# Patient Record
Sex: MALE | Race: WHITE | NOT HISPANIC OR LATINO | Employment: UNEMPLOYED | URBAN - METROPOLITAN AREA
[De-identification: names, ages, dates, MRNs, and addresses within clinical notes are randomized per-mention and may not be internally consistent; named-entity substitution may affect disease eponyms.]

---

## 2018-01-15 ENCOUNTER — APPOINTMENT (OUTPATIENT)
Dept: RADIOLOGY | Facility: MEDICAL CENTER | Age: 48
DRG: 473 | End: 2018-01-15
Attending: EMERGENCY MEDICINE
Payer: MEDICAID

## 2018-01-15 ENCOUNTER — HOSPITAL ENCOUNTER (OUTPATIENT)
Dept: RADIOLOGY | Facility: MEDICAL CENTER | Age: 48
End: 2018-01-15

## 2018-01-15 ENCOUNTER — HOSPITAL ENCOUNTER (INPATIENT)
Facility: MEDICAL CENTER | Age: 48
LOS: 3 days | DRG: 473 | End: 2018-01-21
Attending: EMERGENCY MEDICINE | Admitting: INTERNAL MEDICINE
Payer: MEDICAID

## 2018-01-15 ENCOUNTER — RESOLUTE PROFESSIONAL BILLING HOSPITAL PROF FEE (OUTPATIENT)
Dept: HOSPITALIST | Facility: MEDICAL CENTER | Age: 48
End: 2018-01-15
Payer: MEDICAID

## 2018-01-15 DIAGNOSIS — R26.81 GAIT INSTABILITY: ICD-10-CM

## 2018-01-15 DIAGNOSIS — Z98.890 S/P LAMINECTOMY: ICD-10-CM

## 2018-01-15 DIAGNOSIS — R29.898 WEAKNESS OF EXTREMITY: ICD-10-CM

## 2018-01-15 PROBLEM — F31.9 BIPOLAR DISORDER (HCC): Status: ACTIVE | Noted: 2018-01-15

## 2018-01-15 PROBLEM — E03.9 HYPOTHYROID: Status: ACTIVE | Noted: 2018-01-15

## 2018-01-15 PROBLEM — I10 HTN (HYPERTENSION): Status: ACTIVE | Noted: 2018-01-15

## 2018-01-15 LAB
ALBUMIN SERPL BCP-MCNC: 4.8 G/DL (ref 3.2–4.9)
ALBUMIN/GLOB SERPL: 1.4 G/DL
ALP SERPL-CCNC: 106 U/L (ref 30–99)
ALT SERPL-CCNC: 31 U/L (ref 2–50)
ANION GAP SERPL CALC-SCNC: 10 MMOL/L (ref 0–11.9)
AST SERPL-CCNC: 28 U/L (ref 12–45)
BASOPHILS # BLD AUTO: 0.5 % (ref 0–1.8)
BASOPHILS # BLD: 0.05 K/UL (ref 0–0.12)
BILIRUB SERPL-MCNC: 0.5 MG/DL (ref 0.1–1.5)
BUN SERPL-MCNC: 15 MG/DL (ref 8–22)
CALCIUM SERPL-MCNC: 9.9 MG/DL (ref 8.5–10.5)
CHLORIDE SERPL-SCNC: 104 MMOL/L (ref 96–112)
CO2 SERPL-SCNC: 22 MMOL/L (ref 20–33)
CREAT SERPL-MCNC: 0.98 MG/DL (ref 0.5–1.4)
EOSINOPHIL # BLD AUTO: 0.02 K/UL (ref 0–0.51)
EOSINOPHIL NFR BLD: 0.2 % (ref 0–6.9)
ERYTHROCYTE [DISTWIDTH] IN BLOOD BY AUTOMATED COUNT: 40.3 FL (ref 35.9–50)
GLOBULIN SER CALC-MCNC: 3.4 G/DL (ref 1.9–3.5)
GLUCOSE SERPL-MCNC: 92 MG/DL (ref 65–99)
HCT VFR BLD AUTO: 44.1 % (ref 42–52)
HGB BLD-MCNC: 15.5 G/DL (ref 14–18)
IMM GRANULOCYTES # BLD AUTO: 0.05 K/UL (ref 0–0.11)
IMM GRANULOCYTES NFR BLD AUTO: 0.5 % (ref 0–0.9)
LYMPHOCYTES # BLD AUTO: 1.53 K/UL (ref 1–4.8)
LYMPHOCYTES NFR BLD: 16.5 % (ref 22–41)
MCH RBC QN AUTO: 31.3 PG (ref 27–33)
MCHC RBC AUTO-ENTMCNC: 35.1 G/DL (ref 33.7–35.3)
MCV RBC AUTO: 88.9 FL (ref 81.4–97.8)
MONOCYTES # BLD AUTO: 0.47 K/UL (ref 0–0.85)
MONOCYTES NFR BLD AUTO: 5.1 % (ref 0–13.4)
NEUTROPHILS # BLD AUTO: 7.16 K/UL (ref 1.82–7.42)
NEUTROPHILS NFR BLD: 77.2 % (ref 44–72)
NRBC # BLD AUTO: 0 K/UL
NRBC BLD-RTO: 0 /100 WBC
PLATELET # BLD AUTO: 361 K/UL (ref 164–446)
PMV BLD AUTO: 10.7 FL (ref 9–12.9)
POTASSIUM SERPL-SCNC: 4.1 MMOL/L (ref 3.6–5.5)
PROT SERPL-MCNC: 8.2 G/DL (ref 6–8.2)
RBC # BLD AUTO: 4.96 M/UL (ref 4.7–6.1)
SODIUM SERPL-SCNC: 136 MMOL/L (ref 135–145)
TSH SERPL DL<=0.005 MIU/L-ACNC: 2.65 UIU/ML (ref 0.38–5.33)
WBC # BLD AUTO: 9.3 K/UL (ref 4.8–10.8)

## 2018-01-15 PROCEDURE — 700102 HCHG RX REV CODE 250 W/ 637 OVERRIDE(OP): Performed by: FAMILY MEDICINE

## 2018-01-15 PROCEDURE — 99285 EMERGENCY DEPT VISIT HI MDM: CPT

## 2018-01-15 PROCEDURE — 84443 ASSAY THYROID STIM HORMONE: CPT

## 2018-01-15 PROCEDURE — G0378 HOSPITAL OBSERVATION PER HR: HCPCS

## 2018-01-15 PROCEDURE — 99220 PR INITIAL OBSERVATION CARE,LEVL III: CPT | Performed by: FAMILY MEDICINE

## 2018-01-15 PROCEDURE — A9270 NON-COVERED ITEM OR SERVICE: HCPCS | Performed by: FAMILY MEDICINE

## 2018-01-15 PROCEDURE — 85025 COMPLETE CBC W/AUTO DIFF WBC: CPT

## 2018-01-15 PROCEDURE — 36415 COLL VENOUS BLD VENIPUNCTURE: CPT

## 2018-01-15 PROCEDURE — 71045 X-RAY EXAM CHEST 1 VIEW: CPT

## 2018-01-15 PROCEDURE — 80053 COMPREHEN METABOLIC PANEL: CPT

## 2018-01-15 RX ORDER — GABAPENTIN 300 MG/1
300 CAPSULE ORAL 3 TIMES DAILY
Status: ON HOLD | COMMUNITY
Start: 2018-01-17 | End: 2018-11-12

## 2018-01-15 RX ORDER — GABAPENTIN 300 MG/1
300 CAPSULE ORAL 3 TIMES DAILY
Status: DISCONTINUED | OUTPATIENT
Start: 2018-01-17 | End: 2018-01-21 | Stop reason: HOSPADM

## 2018-01-15 RX ORDER — GABAPENTIN 300 MG/1
300 CAPSULE ORAL 3 TIMES DAILY
COMMUNITY
End: 2018-01-15

## 2018-01-15 RX ORDER — GEMFIBROZIL 600 MG/1
600 TABLET, FILM COATED ORAL 2 TIMES DAILY
COMMUNITY

## 2018-01-15 RX ORDER — ONDANSETRON 4 MG/1
4 TABLET, ORALLY DISINTEGRATING ORAL EVERY 4 HOURS PRN
Status: DISCONTINUED | OUTPATIENT
Start: 2018-01-15 | End: 2018-01-19

## 2018-01-15 RX ORDER — CYCLOBENZAPRINE HCL 10 MG
10 TABLET ORAL 3 TIMES DAILY PRN
Status: DISCONTINUED | OUTPATIENT
Start: 2018-01-15 | End: 2018-01-21 | Stop reason: HOSPADM

## 2018-01-15 RX ORDER — CYCLOBENZAPRINE HCL 10 MG
10 TABLET ORAL 2 TIMES DAILY
Status: ON HOLD | COMMUNITY
End: 2018-11-04

## 2018-01-15 RX ORDER — LEVOTHYROXINE SODIUM 0.05 MG/1
50 TABLET ORAL
COMMUNITY

## 2018-01-15 RX ORDER — LAMOTRIGINE 100 MG/1
100 TABLET ORAL DAILY
COMMUNITY
End: 2018-01-15

## 2018-01-15 RX ORDER — HYDROXYZINE 50 MG/1
50 TABLET, FILM COATED ORAL 3 TIMES DAILY
Status: ON HOLD | COMMUNITY
End: 2018-11-04

## 2018-01-15 RX ORDER — GEMFIBROZIL 600 MG/1
600 TABLET, FILM COATED ORAL 2 TIMES DAILY
Status: DISCONTINUED | OUTPATIENT
Start: 2018-01-15 | End: 2018-01-21 | Stop reason: HOSPADM

## 2018-01-15 RX ORDER — BISACODYL 10 MG
10 SUPPOSITORY, RECTAL RECTAL
Status: DISCONTINUED | OUTPATIENT
Start: 2018-01-15 | End: 2018-01-19

## 2018-01-15 RX ORDER — METHYLPREDNISOLONE 4 MG/1
4 TABLET ORAL DAILY
Status: ON HOLD | COMMUNITY
Start: 2018-01-11 | End: 2018-01-21

## 2018-01-15 RX ORDER — PROMETHAZINE HYDROCHLORIDE 25 MG/1
12.5-25 TABLET ORAL EVERY 4 HOURS PRN
Status: DISCONTINUED | OUTPATIENT
Start: 2018-01-15 | End: 2018-01-21 | Stop reason: HOSPADM

## 2018-01-15 RX ORDER — LABETALOL HYDROCHLORIDE 5 MG/ML
10 INJECTION, SOLUTION INTRAVENOUS EVERY 6 HOURS PRN
Status: DISCONTINUED | OUTPATIENT
Start: 2018-01-15 | End: 2018-01-21 | Stop reason: HOSPADM

## 2018-01-15 RX ORDER — LAMOTRIGINE 25 MG/1
75 TABLET ORAL
Status: ON HOLD | COMMUNITY
Start: 2018-01-14 | End: 2018-01-21

## 2018-01-15 RX ORDER — HYDROXYZINE HYDROCHLORIDE 25 MG/1
50 TABLET, FILM COATED ORAL 3 TIMES DAILY PRN
Status: DISCONTINUED | OUTPATIENT
Start: 2018-01-15 | End: 2018-01-21 | Stop reason: HOSPADM

## 2018-01-15 RX ORDER — LEVOTHYROXINE SODIUM 0.05 MG/1
50 TABLET ORAL
Status: DISCONTINUED | OUTPATIENT
Start: 2018-01-16 | End: 2018-01-21 | Stop reason: HOSPADM

## 2018-01-15 RX ORDER — ACETAMINOPHEN 325 MG/1
650 TABLET ORAL EVERY 6 HOURS PRN
Status: DISCONTINUED | OUTPATIENT
Start: 2018-01-15 | End: 2018-01-19

## 2018-01-15 RX ORDER — POLYETHYLENE GLYCOL 3350 17 G/17G
1 POWDER, FOR SOLUTION ORAL
Status: DISCONTINUED | OUTPATIENT
Start: 2018-01-15 | End: 2018-01-19

## 2018-01-15 RX ORDER — LAMOTRIGINE 100 MG/1
100 TABLET ORAL DAILY
Status: DISCONTINUED | OUTPATIENT
Start: 2018-01-16 | End: 2018-01-21 | Stop reason: HOSPADM

## 2018-01-15 RX ORDER — AMOXICILLIN 250 MG
2 CAPSULE ORAL 2 TIMES DAILY
Status: DISCONTINUED | OUTPATIENT
Start: 2018-01-15 | End: 2018-01-19

## 2018-01-15 RX ORDER — PROMETHAZINE HYDROCHLORIDE 25 MG/1
12.5-25 SUPPOSITORY RECTAL EVERY 4 HOURS PRN
Status: DISCONTINUED | OUTPATIENT
Start: 2018-01-15 | End: 2018-01-21 | Stop reason: HOSPADM

## 2018-01-15 RX ORDER — ONDANSETRON 2 MG/ML
4 INJECTION INTRAMUSCULAR; INTRAVENOUS EVERY 4 HOURS PRN
Status: DISCONTINUED | OUTPATIENT
Start: 2018-01-15 | End: 2018-01-19

## 2018-01-15 RX ORDER — ENALAPRILAT 1.25 MG/ML
1.25 INJECTION INTRAVENOUS EVERY 6 HOURS PRN
Status: DISCONTINUED | OUTPATIENT
Start: 2018-01-15 | End: 2018-01-21 | Stop reason: HOSPADM

## 2018-01-15 RX ADMIN — GEMFIBROZIL 600 MG: 600 TABLET ORAL at 21:41

## 2018-01-15 RX ADMIN — STANDARDIZED SENNA CONCENTRATE AND DOCUSATE SODIUM 2 TABLET: 8.6; 5 TABLET, FILM COATED ORAL at 21:41

## 2018-01-15 ASSESSMENT — ENCOUNTER SYMPTOMS
SHORTNESS OF BREATH: 0
NECK PAIN: 0
WHEEZING: 0
BACK PAIN: 0
CHILLS: 0
TINGLING: 1
FOCAL WEAKNESS: 1
FEVER: 0
SORE THROAT: 0
SEIZURES: 0
NAUSEA: 0
SPEECH CHANGE: 0
COUGH: 0
VOMITING: 0
BLURRED VISION: 0
HEARTBURN: 0
LOSS OF CONSCIOUSNESS: 0
SENSORY CHANGE: 1
DIZZINESS: 0
ABDOMINAL PAIN: 0
DIARRHEA: 0
WEAKNESS: 1
FALLS: 1
MYALGIAS: 0
TREMORS: 0

## 2018-01-15 ASSESSMENT — PAIN SCALES - GENERAL
PAINLEVEL_OUTOF10: 0

## 2018-01-15 ASSESSMENT — COGNITIVE AND FUNCTIONAL STATUS - GENERAL
HELP NEEDED FOR BATHING: A LITTLE
EATING MEALS: A LITTLE
DRESSING REGULAR LOWER BODY CLOTHING: A LITTLE
SUGGESTED CMS G CODE MODIFIER DAILY ACTIVITY: CJ
CLIMB 3 TO 5 STEPS WITH RAILING: A LITTLE
DAILY ACTIVITIY SCORE: 20
DRESSING REGULAR UPPER BODY CLOTHING: A LITTLE
MOBILITY SCORE: 23
SUGGESTED CMS G CODE MODIFIER MOBILITY: CI

## 2018-01-15 ASSESSMENT — PATIENT HEALTH QUESTIONNAIRE - PHQ9
SUM OF ALL RESPONSES TO PHQ QUESTIONS 1-9: 0
2. FEELING DOWN, DEPRESSED, IRRITABLE, OR HOPELESS: NOT AT ALL
SUM OF ALL RESPONSES TO PHQ9 QUESTIONS 1 AND 2: 0
1. LITTLE INTEREST OR PLEASURE IN DOING THINGS: NOT AT ALL

## 2018-01-15 ASSESSMENT — LIFESTYLE VARIABLES
EVER_SMOKED: YES
DO YOU DRINK ALCOHOL: NO

## 2018-01-15 NOTE — LETTER
[facility] ([facility]) - [id number]  EHR eReferral Notification Requirements    To be sent by secure email to [email or web address] or   by fax to [phone]       FIELDS ARE REQUIRED TO BE COMPLETED     Patient Name:  Santos Layne  MRN:  [id number]   Account Number: [id number]                YOB: 1970    Date Roomed in ED:    1/15/2018   4:39 PM  Date First Observation Order Placed: 1/15/2018   6:42 PM  Date First Inpatient Order Placed:        Date of Previous Admission (Needed For Readmission Reviews Only):     Discharge Date and Time (if applicable): No discharge date for patient encounter.     PLEASE CHECK OFF TYPE OF REVIEW & CURRENT ADMISSION STATUS FROM LISTS BELOW  Type of Review:  Admission review    Dates to be Reviewed: 1/15-1/18        Current Admission Status:  Observation-Outpatient    / Contact Number for EHR outcome/recommendation call:    Rashmi Ferraro [phone]     Attending Physician/ Contact Number (if not the same as in electronic record):  Dr. Willis Alarcon [phone]     Comments:  Please review as patient has spinal cord compression    Additional Information being Emailed or Faxed:  no       Fax Handwritten Supporting Documents to EHR at [phone]      [address] [address]  [phone]  [email or web address]    Updated December 17, 2014

## 2018-01-15 NOTE — ED NOTES
46 y/o male bib family with c/o unsteady gait and multiple falls since October 2017. Pt wife states he has been worked up in [employer] but has not been given a clear diagnosis.

## 2018-01-16 LAB
ALBUMIN SERPL BCP-MCNC: 4.1 G/DL (ref 3.2–4.9)
ALBUMIN/GLOB SERPL: 1.5 G/DL
ALP SERPL-CCNC: 88 U/L (ref 30–99)
ALT SERPL-CCNC: 27 U/L (ref 2–50)
ANION GAP SERPL CALC-SCNC: 9 MMOL/L (ref 0–11.9)
AST SERPL-CCNC: 22 U/L (ref 12–45)
BASOPHILS # BLD AUTO: 0.5 % (ref 0–1.8)
BASOPHILS # BLD: 0.04 K/UL (ref 0–0.12)
BILIRUB SERPL-MCNC: 0.5 MG/DL (ref 0.1–1.5)
BUN SERPL-MCNC: 16 MG/DL (ref 8–22)
CALCIUM SERPL-MCNC: 9.1 MG/DL (ref 8.5–10.5)
CHLORIDE SERPL-SCNC: 105 MMOL/L (ref 96–112)
CO2 SERPL-SCNC: 24 MMOL/L (ref 20–33)
CREAT SERPL-MCNC: 0.99 MG/DL (ref 0.5–1.4)
EOSINOPHIL # BLD AUTO: 0.04 K/UL (ref 0–0.51)
EOSINOPHIL NFR BLD: 0.5 % (ref 0–6.9)
ERYTHROCYTE [DISTWIDTH] IN BLOOD BY AUTOMATED COUNT: 40.5 FL (ref 35.9–50)
GLOBULIN SER CALC-MCNC: 2.7 G/DL (ref 1.9–3.5)
GLUCOSE SERPL-MCNC: 98 MG/DL (ref 65–99)
HCT VFR BLD AUTO: 41 % (ref 42–52)
HGB BLD-MCNC: 14 G/DL (ref 14–18)
IMM GRANULOCYTES # BLD AUTO: 0.04 K/UL (ref 0–0.11)
IMM GRANULOCYTES NFR BLD AUTO: 0.5 % (ref 0–0.9)
LV EJECT FRACT  99904: 65
LV EJECT FRACT MOD 2C 99903: 56.63
LV EJECT FRACT MOD 4C 99902: 67
LV EJECT FRACT MOD BP 99901: 60.55
LYMPHOCYTES # BLD AUTO: 2.64 K/UL (ref 1–4.8)
LYMPHOCYTES NFR BLD: 34.1 % (ref 22–41)
MCH RBC QN AUTO: 30.2 PG (ref 27–33)
MCHC RBC AUTO-ENTMCNC: 34.1 G/DL (ref 33.7–35.3)
MCV RBC AUTO: 88.6 FL (ref 81.4–97.8)
MONOCYTES # BLD AUTO: 0.66 K/UL (ref 0–0.85)
MONOCYTES NFR BLD AUTO: 8.5 % (ref 0–13.4)
NEUTROPHILS # BLD AUTO: 4.33 K/UL (ref 1.82–7.42)
NEUTROPHILS NFR BLD: 55.9 % (ref 44–72)
NRBC # BLD AUTO: 0 K/UL
NRBC BLD-RTO: 0 /100 WBC
PLATELET # BLD AUTO: 364 K/UL (ref 164–446)
PMV BLD AUTO: 11.5 FL (ref 9–12.9)
POTASSIUM SERPL-SCNC: 3.5 MMOL/L (ref 3.6–5.5)
PROT SERPL-MCNC: 6.8 G/DL (ref 6–8.2)
RBC # BLD AUTO: 4.63 M/UL (ref 4.7–6.1)
SODIUM SERPL-SCNC: 138 MMOL/L (ref 135–145)
WBC # BLD AUTO: 7.8 K/UL (ref 4.8–10.8)

## 2018-01-16 PROCEDURE — 93306 TTE W/DOPPLER COMPLETE: CPT

## 2018-01-16 PROCEDURE — 93306 TTE W/DOPPLER COMPLETE: CPT | Mod: 26 | Performed by: INTERNAL MEDICINE

## 2018-01-16 PROCEDURE — A9270 NON-COVERED ITEM OR SERVICE: HCPCS | Performed by: FAMILY MEDICINE

## 2018-01-16 PROCEDURE — 99225 PR SUBSEQUENT OBSERVATION CARE,LEVEL II: CPT | Performed by: INTERNAL MEDICINE

## 2018-01-16 PROCEDURE — 80053 COMPREHEN METABOLIC PANEL: CPT

## 2018-01-16 PROCEDURE — 85025 COMPLETE CBC W/AUTO DIFF WBC: CPT

## 2018-01-16 PROCEDURE — 700102 HCHG RX REV CODE 250 W/ 637 OVERRIDE(OP): Performed by: FAMILY MEDICINE

## 2018-01-16 PROCEDURE — 36415 COLL VENOUS BLD VENIPUNCTURE: CPT

## 2018-01-16 PROCEDURE — G0378 HOSPITAL OBSERVATION PER HR: HCPCS

## 2018-01-16 RX ADMIN — GEMFIBROZIL 600 MG: 600 TABLET ORAL at 19:15

## 2018-01-16 RX ADMIN — LAMOTRIGINE 100 MG: 100 TABLET ORAL at 07:25

## 2018-01-16 RX ADMIN — VITAMIN D, TAB 1000IU (100/BT) 2000 UNITS: 25 TAB at 07:25

## 2018-01-16 RX ADMIN — GEMFIBROZIL 600 MG: 600 TABLET ORAL at 07:24

## 2018-01-16 RX ADMIN — LEVOTHYROXINE SODIUM 50 MCG: 50 TABLET ORAL at 06:08

## 2018-01-16 ASSESSMENT — ENCOUNTER SYMPTOMS
FEVER: 0
CHILLS: 0
FOCAL WEAKNESS: 1
NAUSEA: 0
HEARTBURN: 0
BLURRED VISION: 0
PALPITATIONS: 0
MYALGIAS: 1
HEADACHES: 0
DIZZINESS: 0

## 2018-01-16 ASSESSMENT — PAIN SCALES - GENERAL
PAINLEVEL_OUTOF10: 0

## 2018-01-16 NOTE — DISCHARGE PLANNING
Medical Social Worker    Pt's bedside RN informed SW that pt and family were requesting SW presence. SW met with family and pt. Pt's family stated that they only had 40 dollars left and were no longer able to afford staying in a hotel in [place].     Pt stated that he refuses to have family sleep in their car and wanted to leave AMA if it came to this. SW discussed options with family. SW asked if they would be receiving more money within the next few days. They stated that they could ask a family member for some monetary assistance.     Family is currently discussing if they should return home to [place] and come back to [place] once they have more money. SW asked them to please request for SW presence again if it is needed.     Add:  KAMRON was updated by family that they have received money through a money gram and will be staying at the [facility].

## 2018-01-16 NOTE — CARE PLAN
Problem: Communication  Goal: The ability to communicate needs accurately and effectively will improve    Intervention: Educate patient and significant other/support system about the plan of care, procedures, treatments, medications and allow for questions  Pt and family educated on POC, allowed time for questions. SW and hospitalist involved.      Problem: Knowledge Deficit  Goal: Knowledge of disease process/condition, treatment plan, diagnostic tests, and medications will improve    Intervention: Explain information regarding disease process/condition, treatment plan, diagnostic tests, and medications and document in education  Pt educated on need for MRI and neuro consult.

## 2018-01-16 NOTE — ED NOTES
Med rec updated and complete.  Allergies reviewed.  Pt denies antibiotic use in last 30 days.  Pt is to have his Lamictal discontinued after  tomorrow nights dose and pt is to begin gabapentin 300 mg TID.

## 2018-01-16 NOTE — H&P
Hospital Medicine History and Physical    Date of Service  1/15/2018    Chief Complaint  Chief Complaint   Patient presents with   • Unsteady Gait   • Fall       History of Presenting Illness  47 y.o. male who presented 1/15/2018 with weakness of extremities as well as intermittent numbness and tingling which has been ongoing for the past 6 months. For the past 3 months as become more pronounced. He has had episodes where his legs suddenly gave out and he would fall to the ground. There was no loss of consciousness or syncopal episodes. He denies having any headache dizziness or lightheadedness, chest pain palpitations shortness breath. Has also had numbness and feeling primarily of his hands and fingers. He has also dropped things in his hand more and the left than on the right. He has had workup with MRI of the brain as well as cervical and lumbar spines which which showed possible demyelination changes. He not been able see a neurologist. Today his legs gave way again and he fell down. And then presented the emergency room. It was also noted to be quite hypertensive here in the emergency room. Denies any history of hypertension states that his blood pressure will go up when he is nervous     Primary Care Physician  No primary care provider on file.    Consultants  Consult Neurology    Code Status  Full    Review of Systems  Review of Systems   Constitutional: Negative for chills, fever and malaise/fatigue.   HENT: Negative for hearing loss and sore throat.    Eyes: Negative for blurred vision.   Respiratory: Negative for cough, shortness of breath and wheezing.    Cardiovascular: Negative for chest pain and leg swelling.   Gastrointestinal: Negative for abdominal pain, diarrhea, heartburn, nausea and vomiting.   Genitourinary: Negative for dysuria.   Musculoskeletal: Positive for falls. Negative for back pain, joint pain, myalgias and neck pain.   Neurological: Positive for tingling, sensory change, focal  weakness and weakness. Negative for dizziness, tremors, speech change, seizures and loss of consciousness.        Past Medical History  Past Medical History:   Diagnosis Date   • HTN (hypertension) 1/15/2018   • Arthritis    • Back pain    • Cataract    • Disorder of thyroid    • Fall    • Psychiatric problem     Bipolar medication       Surgical History  No past surgical history on file.    Medications  No current facility-administered medications on file prior to encounter.      No current outpatient prescriptions on file prior to encounter.       Family History  Family History   Problem Relation Age of Onset   • Diabetes Mother    • Heart Disease Mother    • Hypertension Mother    • Hyperlipidemia Mother    • Lung Disease Father    • Cancer Father    • Hypertension Father    • Hyperlipidemia Father    • Cancer Paternal Aunt        Social History  Social History   Substance Use Topics   • Smoking status: Former Smoker   • Smokeless tobacco: Former User     Quit date: 1/15/2016   • Alcohol use No       Allergies  Allergies   Allergen Reactions   • Pcn [Penicillins] Unspecified     Mother stated he had a reaction as a baby        Physical Exam  Laboratory   Hemodynamics  Temp (24hrs), Av.8 °C (98.3 °F), Min:36.8 °C (98.2 °F), Max:36.8 °C (98.3 °F)   Temperature: 36.8 °C (98.3 °F)  Pulse  Av  Min: 60  Max: 105 Heart Rate (Monitored): (!) 102  Blood Pressure: 158/109, NIBP: 145/114      Respiratory      Respiration: 18, Pulse Oximetry: 95 %             Physical Exam   Constitutional: He is oriented to person, place, and time. He appears well-developed and well-nourished.   HENT:   Head: Normocephalic and atraumatic.   Eyes: Conjunctivae are normal. Pupils are equal, round, and reactive to light.   Neck: No tracheal deviation present. No thyromegaly present.   Cardiovascular: Normal rate and regular rhythm.    Pulmonary/Chest: Effort normal and breath sounds normal.   Abdominal: Soft. Bowel sounds are normal.  He exhibits no distension. There is no tenderness.   Musculoskeletal: He exhibits no edema.   Lymphadenopathy:     He has no cervical adenopathy.   Neurological: He is alert and oriented to person, place, and time. No cranial nerve deficit.   MMT 5/5 except LUE 4/5   Skin: Skin is warm and dry.   Nursing note and vitals reviewed.      Recent Labs      01/15/18   1345   WBC  9.3   RBC  4.96   HEMOGLOBIN  15.5   HEMATOCRIT  44.1   MCV  88.9   MCH  31.3   MCHC  35.1   RDW  40.3   PLATELETCT  361   MPV  10.7     Recent Labs      01/15/18   1345   SODIUM  136   POTASSIUM  4.1   CHLORIDE  104   CO2  22   GLUCOSE  92   BUN  15   CREATININE  0.98   CALCIUM  9.9     Recent Labs      01/15/18   1345   ALTSGPT  31   ASTSGOT  28   ALKPHOSPHAT  106*   TBILIRUBIN  0.5   GLUCOSE  92                 No results found for: TROPONINI  Urinalysis:  No results found for: SPECGRAVITY, GLUCOSEUR, KETONES, NITRITE, WBCURINE, RBCURINE, BACTERIA, EPITHELCELL     Imaging    MRI reviewed from outside hospital   Assessment/Plan     I anticipate this patient is appropriate for observation status at this time.    * Weakness of extremity- (present on admission)   Assessment & Plan    Consult neurology        HTN (hypertension)- (present on admission)   Assessment & Plan    IV Vasotec and labetalol as needed for now  Check echocardiogram        Hypothyroid- (present on admission)   Assessment & Plan    Continue levothyroxine, check TSH        Bipolar disorder (CMS-HCC)- (present on admission)   Assessment & Plan    Continue Lamictal            VTE prophylaxis: Lovenox.

## 2018-01-16 NOTE — PROGRESS NOTES
[facility] Hospitalist Progress Note    Date of Service: 2018    Chief Complaint  47 y.o. male admitted 1/15/2018 with b/l lower extremity weakness     Interval Problem Update  Pt seen and examined, afebrile, no overnight events, still having some lower extremity weakness but states is better.     Consultants/Specialty  Neurology    Disposition  TBD         Review of Systems   Constitutional: Negative for chills and fever.   HENT: Negative for hearing loss.    Eyes: Negative for blurred vision.   Cardiovascular: Negative for chest pain and palpitations.   Gastrointestinal: Negative for heartburn and nausea.   Musculoskeletal: Positive for myalgias.   Skin: Negative for rash.   Neurological: Positive for focal weakness. Negative for dizziness and headaches.      Physical Exam  Laboratory/Imaging   Hemodynamics  Temp (24hrs), Av.5 °C (97.7 °F), Min:36.2 °C (97.2 °F), Max:36.7 °C (98 °F)   Temperature: 36.6 °C (97.8 °F)  Pulse  Av  Min: 60  Max: 105 Heart Rate (Monitored): (!) 102  Blood Pressure: 147/99, NIBP: 145/114      Respiratory      Respiration: 18, Pulse Oximetry: 96 %             Fluids    Intake/Output Summary (Last 24 hours) at 18 1530  Last data filed at 18 0800   Gross per 24 hour   Intake              540 ml   Output                0 ml   Net              540 ml       Nutrition  Orders Placed This Encounter   Procedures   • Diet Order     Standing Status:   Standing     Number of Occurrences:   1     Order Specific Question:   Diet:     Answer:   Regular [1]     Physical Exam   Constitutional: He is oriented to person, place, and time.   HENT:   Head: Normocephalic and atraumatic.   Eyes: Conjunctivae are normal. No scleral icterus.   Neck: Neck supple. No JVD present.   Cardiovascular: Normal heart sounds.  Exam reveals no gallop.    Pulmonary/Chest: Effort normal and breath sounds normal. He has no wheezes. He has no rales.   Abdominal: Soft. Bowel sounds are normal. He exhibits  no distension. There is no tenderness.   Musculoskeletal: He exhibits no edema.   Neurological: He is alert and oriented to person, place, and time.   MMT 5/5 except LUE 4/5   Skin: Skin is warm and dry. No rash noted. No erythema.       Recent Labs      01/15/18   1345  01/16/18   0243   WBC  9.3  7.8   RBC  4.96  4.63*   HEMOGLOBIN  15.5  14.0   HEMATOCRIT  44.1  41.0*   MCV  88.9  88.6   MCH  31.3  30.2   MCHC  35.1  34.1   RDW  40.3  40.5   PLATELETCT  361  364   MPV  10.7  11.5     Recent Labs      01/15/18   1345  01/16/18   0243   SODIUM  136  138   POTASSIUM  4.1  3.5*   CHLORIDE  104  105   CO2  22  24   GLUCOSE  92  98   BUN  15  16   CREATININE  0.98  0.99   CALCIUM  9.9  9.1                      Assessment/Plan     * Weakness of extremity- (present on admission)   Assessment & Plan    MRI outpt showing some demyelination, neurology consulted, rec to MRI of C/T/L spine, appreciate rec.   PT/OT          HTN (hypertension)- (present on admission)   Assessment & Plan    IV Vasotec and labetalol as needed for now  Check echocardiogram        Hypothyroid- (present on admission)   Assessment & Plan    Continue levothyroxine, check TSH        Bipolar disorder (CMS-HCC)- (present on admission)   Assessment & Plan    Continue Lamictal            Reviewed items::  Labs reviewed, Radiology images reviewed and Medications reviewed  Lowe catheter::  No Lowe  DVT prophylaxis pharmacological::  Heparin

## 2018-01-16 NOTE — PROGRESS NOTES
Laura Saucedo Fall Risk Assessment:     Last Known Fall: Within the last month  Mobility: Dizziness/generalized weakness  Medications: Cardiovascular or central nervous system meds  Mental Status/LOC/Awareness: Awake, alert, and oriented to date, place, and person  Toileting Needs: No needs  Volume/Electrolyte Status: No problems  Communication/Sensory: Visual (Glasses)/hearing deficit  Behavior: Appropriate behavior  Laura Saucedo Fall Risk Total: 8  Fall Risk Level: LOW RISK    Universal Fall Precautions:  call light/belongings in reach, bed in low position and locked, wheelchairs and assistive devices out of sight, siderails up x 2, use non-slip footwear, adequate lighting, clutter free and spill free environment, educate on level of risk, educate to call for assistance    Fall Risk Level Interventions:   TRIAL (TELE 8, NEURO, MED SAMPSON 5) Low Fall Risk Interventions  Place yellow fall risk ID band on patient: verified  Provide patient/family education based on risk assessment: verified  Educate patient/family to call staff for assistance when getting out of bed: verified  Place fall precaution signage outside patient door: verified      Patient Specific Interventions:     Medication: review medications with patient and family  Mental Status/LOC/Awareness: reinforce falls education and reinforce the use of call light  Toileting: provide frquent toileting  Volume/Electrolyte Status: ensure patient remains hydrated  Communication/Sensory: update plan of care on whiteboard  Behavioral: encourage patient to voice feelings and engage patient in daily activities  Mobility: provide comfort measures during transport and ensure bed is locked and in lowest position

## 2018-01-16 NOTE — CARE PLAN
Problem: Safety  Goal: Will remain free from falls  Outcome: PROGRESSING AS EXPECTED  Patient free of falls and fall precautions in place.    Problem: Infection  Goal: Will remain free from infection  Outcome: PROGRESSING AS EXPECTED  Patient free of signs and symptoms of infection, education on infection prevention and handwashing given.

## 2018-01-16 NOTE — PROGRESS NOTES
Laura Saucedo Fall Risk Assessment:     Last Known Fall: Within the last month  Mobility: Dizziness/generalized weakness  Medications: Cardiovascular or central nervous system meds  Mental Status/LOC/Awareness: Awake, alert, and oriented to date, place, and person  Toileting Needs: No needs  Volume/Electrolyte Status: No problems  Communication/Sensory: Visual (Glasses)/hearing deficit  Behavior: Appropriate behavior  Laura Saucedo Fall Risk Total: 8  Fall Risk Level: LOW RISK    Universal Fall Precautions:  call light/belongings in reach, bed in low position and locked, wheelchairs and assistive devices out of sight, siderails up x 2, use non-slip footwear, adequate lighting, clutter free and spill free environment, educate on level of risk, educate to call for assistance    Fall Risk Level Interventions:   TRIAL (TELE 8, NEURO, MED SAMPSON 5) Low Fall Risk Interventions  Place yellow fall risk ID band on patient: verified  Provide patient/family education based on risk assessment: verified  Educate patient/family to call staff for assistance when getting out of bed: verified  Place fall precaution signage outside patient door: verified      Patient Specific Interventions:     Medication: not applicable  Mental Status/LOC/Awareness: not applicable  Toileting: provide frquent toileting  Volume/Electrolyte Status: ensure patient remains hydrated  Communication/Sensory: update plan of care on whiteboard  Behavioral: not applicable  Mobility: utilize bed/chair fall alarm and ensure bed is locked and in lowest position

## 2018-01-16 NOTE — ED NOTES
"Family reports last fall today. Pt denies ever hitting head or losing consciousness. States his legs just \"give out\".    "

## 2018-01-16 NOTE — PROGRESS NOTES
Pt is alert and oriented x4. Denies pain at this time. Refused Lovenox and stool softeners this morning. Tolerating regular diet. Ambulates with SBA. Reports numbness and tingling in hands, fingers and feet. Awaiting neuro consult. Bed alarm on, call light within reach, hourly rounding in place.

## 2018-01-16 NOTE — PROGRESS NOTES
Bed alarm off at this time per Pt request. Pt is AAO x4, expresses understanding on calling for assistance. Other fall precautions in place.

## 2018-01-16 NOTE — ED PROVIDER NOTES
"ED Provider Note    Scribed for Dallin Nuñez M.D. by Barrera Hart. 1/15/2018, 5:37 PM.    Primary care provider: None  Means of arrival: walk-in  History obtained from: patient  History limited by: none    CHIEF COMPLAINT  Chief Complaint   Patient presents with   • Unsteady Gait   • Fall       HPI  Santos Layne is a 47 y.o. male who presents to the Emergency Department for evaluation of left leg weakness onset three months ago. The patient states his left leg weakness has been intermittent since onset. He explains, \"my left leg just gives out, but sometimes I get better and sometimes I get worse\". Per patient he has been feeling unsteady when walking secondary to his leg weakness for the past three months, resulting in multiple falls. His last fall was today, and he states he falls about 2-3 times per week. He endorses associated stiffness to his right and left digits, tingling to the digits, which is also new. Wife reports the patient's symptoms have been worsening since onset. Patient confirms he was worked-up in [place], where he had an MRI performed.The patient does not note any recent trauma. Patient also does not note any exacerbating or alleviating factors. He denies joint pain.    REVIEW OF SYSTEMS  Review of Systems   Musculoskeletal: Positive for falls. Negative for joint pain.        Positive digit stiffness  Positive unsteady gait   Neurological: Positive for tingling and focal weakness (left leg).   All other systems reviewed and are negative.    C.      PAST MEDICAL HISTORY    No history pertinent to complaint.     SURGICAL HISTORY  patient denies any surgical history    SOCIAL HISTORY  Social History   Substance Use Topics   • Smoking status: Former Smoker   • Smokeless tobacco: Never Used   • Alcohol use No      History   Drug Use No       FAMILY HISTORY  History reviewed. No pertinent family history.    CURRENT MEDICATIONS  Home Medications     Reviewed by Nupur Royal R.N. " (Registered Nurse) on 01/15/18 at 1714  Med List Status: Partial   Medication Last Dose Status   cyclobenzaprine (FLEXERIL) 10 MG Tab  Active   diclofenac EC (VOLTAREN) 50 MG Tablet Delayed Response  Active   gabapentin (NEURONTIN) 300 MG Cap  Active   gemfibrozil (LOPID) 600 MG Tab  Active   hydrOXYzine HCl (ATARAX) 50 MG Tab  Active   lamotrigine (LAMICTAL) 100 MG Tab  Active   levothyroxine (SYNTHROID) 50 MCG Tab  Active   vitamin D (CHOLECALCIFEROL) 1000 UNIT Tab  Active                ALLERGIES  Allergies   Allergen Reactions   • Pcn [Penicillins]        PHYSICAL EXAM  VITAL SIGNS: /109   Pulse 96   Temp 36.8 °C (98.3 °F)   Resp 18   Ht 1.829 m (6')   Wt 97.6 kg (215 lb 2.7 oz)   SpO2 97%   BMI 29.18 kg/m²   Vitals reviewed.  Constitutional: Awake alert ill-appearing no acute distress  HENT: Normocephalic, Atraumatic, Bilateral external ears normal, Oropharynx moist, No oral exudates, Nose normal.   Eyes: PERRL, EOMI, Conjunctiva normal, No discharge.   Neck: Normal range of motion, No tenderness, Supple, No stridor.   Cardiovascular: Normal heart rate, Normal rhythm, No murmurs, No rubs, No gallops.   Thorax & Lungs: Normal breath sounds, No respiratory distress, No wheezing, No chest tenderness.   Abdomen: Bowel sounds normal, Soft, No tenderness  Skin: Warm, Dry, No erythema, No rash.   Back: No tenderness, No CVA tenderness.   Musculoskeletal: Good range of motion in all major joints. No edema. No tenderness to palpation or major deformities noted.   Neurologic: Alert, weakness with dorsiflexion of his right foot and somewhat plantar flexion. Weakness in his right leg demonstrated with a relation only. Subjective decreased sensation dorsum of right arms. Difficulty with grasp bilaterally.   Psychiatric: Affect normal    LABS  Results for orders placed or performed during the hospital encounter of 01/15/18   CBC WITH DIFFERENTIAL   Result Value Ref Range    WBC 9.3 4.8 - 10.8 K/uL    RBC 4.96  4.70 - 6.10 M/uL    Hemoglobin 15.5 14.0 - 18.0 g/dL    Hematocrit 44.1 42.0 - 52.0 %    MCV 88.9 81.4 - 97.8 fL    MCH 31.3 27.0 - 33.0 pg    MCHC 35.1 33.7 - 35.3 g/dL    RDW 40.3 35.9 - 50.0 fL    Platelet Count 361 164 - 446 K/uL    MPV 10.7 9.0 - 12.9 fL    Neutrophils-Polys 77.20 (H) 44.00 - 72.00 %    Lymphocytes 16.50 (L) 22.00 - 41.00 %    Monocytes 5.10 0.00 - 13.40 %    Eosinophils 0.20 0.00 - 6.90 %    Basophils 0.50 0.00 - 1.80 %    Immature Granulocytes 0.50 0.00 - 0.90 %    Nucleated RBC 0.00 /100 WBC    Neutrophils (Absolute) 7.16 1.82 - 7.42 K/uL    Lymphs (Absolute) 1.53 1.00 - 4.80 K/uL    Monos (Absolute) 0.47 0.00 - 0.85 K/uL    Eos (Absolute) 0.02 0.00 - 0.51 K/uL    Baso (Absolute) 0.05 0.00 - 0.12 K/uL    Immature Granulocytes (abs) 0.05 0.00 - 0.11 K/uL    NRBC (Absolute) 0.00 K/uL   CMP   Result Value Ref Range    Sodium 136 135 - 145 mmol/L    Potassium 4.1 3.6 - 5.5 mmol/L    Chloride 104 96 - 112 mmol/L    Co2 22 20 - 33 mmol/L    Anion Gap 10.0 0.0 - 11.9    Glucose 92 65 - 99 mg/dL    Bun 15 8 - 22 mg/dL    Creatinine 0.98 0.50 - 1.40 mg/dL    Calcium 9.9 8.5 - 10.5 mg/dL    AST(SGOT) 28 12 - 45 U/L    ALT(SGPT) 31 2 - 50 U/L    Alkaline Phosphatase 106 (H) 30 - 99 U/L    Total Bilirubin 0.5 0.1 - 1.5 mg/dL    Albumin 4.8 3.2 - 4.9 g/dL    Total Protein 8.2 6.0 - 8.2 g/dL    Globulin 3.4 1.9 - 3.5 g/dL    A-G Ratio 1.4 g/dL   ESTIMATED GFR   Result Value Ref Range    GFR If African American >60 >60 mL/min/1.73 m 2    GFR If Non African American >60 >60 mL/min/1.73 m 2      All labs reviewed by me.      RADIOLOGY  DX-CHEST-LIMITED (1 VIEW)   Final Result         Low lung volume with hypoventilatory change and bibasilar atelectasis.      MR-FOREIGN FILM MRI   Final Result      OUTSIDE IMAGES-MR LUMBAR SPINE   Final Result      OUTSIDE IMAGES-MR BRAIN   Final Result        The radiologist's interpretation of all radiological studies have been reviewed by me.    COURSE & MEDICAL DECISION  MAKING  Pertinent Labs & Imaging studies reviewed. (See chart for details)    Obt[facility] and [facility] including [facility]. And labs    5:37 PM Patient seen and examined at bedside. The patient presents with leg weakness and extremity venous, and the differential diagnosis includes but is not limited to MS, malignancy, paraneoplastic syndrome versus other. Ordered for chest x-ray, estimated GFR, CBC with differential, CMP, chest x-ray to evaluate. I will also consult neurology. I discussed the plan as above with the patient. He understood and verbalized agreement.      5:50 PM - Paged Neurology    5:59 PM - I discussed the patient's case and the above findings with Dr. Marshall (Neurology) who recommends admission for further work-up.      6:33 PM - I discussed the patient's case and the above findings with Dr. Ley (Hospitalist) who agrees to admit the patient.     Results for orders placed or performed during the hospital encounter of 01/15/18   CBC WITH DIFFERENTIAL   Result Value Ref Range    WBC 9.3 4.8 - 10.8 K/uL    RBC 4.96 4.70 - 6.10 M/uL    Hemoglobin 15.5 14.0 - 18.0 g/dL    Hematocrit 44.1 42.0 - 52.0 %    MCV 88.9 81.4 - 97.8 fL    MCH 31.3 27.0 - 33.0 pg    MCHC 35.1 33.7 - 35.3 g/dL    RDW 40.3 35.9 - 50.0 fL    Platelet Count 361 164 - 446 K/uL    MPV 10.7 9.0 - 12.9 fL    Neutrophils-Polys 77.20 (H) 44.00 - 72.00 %    Lymphocytes 16.50 (L) 22.00 - 41.00 %    Monocytes 5.10 0.00 - 13.40 %    Eosinophils 0.20 0.00 - 6.90 %    Basophils 0.50 0.00 - 1.80 %    Immature Granulocytes 0.50 0.00 - 0.90 %    Nucleated RBC 0.00 /100 WBC    Neutrophils (Absolute) 7.16 1.82 - 7.42 K/uL    Lymphs (Absolute) 1.53 1.00 - 4.80 K/uL    Monos (Absolute) 0.47 0.00 - 0.85 K/uL    Eos (Absolute) 0.02 0.00 - 0.51 K/uL    Baso (Absolute) 0.05 0.00 - 0.12 K/uL    Immature Granulocytes (abs) 0.05 0.00 - 0.11 K/uL    NRBC (Absolute) 0.00 K/uL   CMP   Result Value Ref Range     Sodium 136 135 - 145 mmol/L    Potassium 4.1 3.6 - 5.5 mmol/L    Chloride 104 96 - 112 mmol/L    Co2 22 20 - 33 mmol/L    Anion Gap 10.0 0.0 - 11.9    Glucose 92 65 - 99 mg/dL    Bun 15 8 - 22 mg/dL    Creatinine 0.98 0.50 - 1.40 mg/dL    Calcium 9.9 8.5 - 10.5 mg/dL    AST(SGOT) 28 12 - 45 U/L    ALT(SGPT) 31 2 - 50 U/L    Alkaline Phosphatase 106 (H) 30 - 99 U/L    Total Bilirubin 0.5 0.1 - 1.5 mg/dL    Albumin 4.8 3.2 - 4.9 g/dL    Total Protein 8.2 6.0 - 8.2 g/dL    Globulin 3.4 1.9 - 3.5 g/dL    A-G Ratio 1.4 g/dL   ESTIMATED GFR   Result Value Ref Range    GFR If African American >60 >60 mL/min/1.73 m 2    GFR If Non African American >60 >60 mL/min/1.73 m 2   TSH (Thyroid Stimulating Hormone)   Result Value Ref Range    TSH 2.650 0.380 - 5.330 uIU/mL        Patient has several weeks to months of weakness and gait instability and progressive and now is having a hard time getting around. He's had MRIs do not show stroke or mass. He does have some lesions that are concerning for multiple sclerosis. Because of the progressive nature of this and the lack of ability to schedule outpatient workup promptly was advised admission is the best course of action therefore I spoke to the hospital she is admitted in guarded condition.    DISPOSITION:  Patient will be admitted to Dr. Ley in guarded condition.     FINAL IMPRESSION  1. Weakness of extremity    2. Gait instability          Barrera GARCIA (Terranceibcoreen), am scribing for, and in the presence of, Dallin Nuñez M.D..    Electronically signed by: Barrera Hart (Benji), 1/15/2018    Dallin GARCIA M.D. personally performed the services described in this documentation, as scribed by Barrera Hart in my presence, and it is both accurate and complete.    The note accurately reflects work and decisions made by me.  Dallin Nuñez  1/15/2018  8:13 PM

## 2018-01-16 NOTE — PROGRESS NOTES
2 RN skin check complete, Heels dry and a scabbed crack noted to L heel. Scabbing noted to L arm, superior to elbow. Otherwise skin intact.

## 2018-01-16 NOTE — ASSESSMENT & PLAN NOTE
MRI outpt showing some demyelination, neurology consulted, rec to MRI of C/T/L spine, appreciate rec.   MRI C spine was done today 1/17/18 showing focal cord compression at C3-C4,  Neurosurgery following, had laminectomy done on 1/19/18  Cont  on decadron

## 2018-01-17 ENCOUNTER — APPOINTMENT (OUTPATIENT)
Dept: RADIOLOGY | Facility: MEDICAL CENTER | Age: 48
DRG: 473 | End: 2018-01-17
Attending: INTERNAL MEDICINE
Payer: MEDICAID

## 2018-01-17 LAB
ANION GAP SERPL CALC-SCNC: 10 MMOL/L (ref 0–11.9)
BUN SERPL-MCNC: 18 MG/DL (ref 8–22)
CALCIUM SERPL-MCNC: 9.5 MG/DL (ref 8.5–10.5)
CHLORIDE SERPL-SCNC: 104 MMOL/L (ref 96–112)
CO2 SERPL-SCNC: 24 MMOL/L (ref 20–33)
CREAT SERPL-MCNC: 0.88 MG/DL (ref 0.5–1.4)
ERYTHROCYTE [DISTWIDTH] IN BLOOD BY AUTOMATED COUNT: 41.8 FL (ref 35.9–50)
GLUCOSE SERPL-MCNC: 90 MG/DL (ref 65–99)
HCT VFR BLD AUTO: 43.2 % (ref 42–52)
HGB BLD-MCNC: 14.7 G/DL (ref 14–18)
MCH RBC QN AUTO: 30.4 PG (ref 27–33)
MCHC RBC AUTO-ENTMCNC: 34 G/DL (ref 33.7–35.3)
MCV RBC AUTO: 89.4 FL (ref 81.4–97.8)
PLATELET # BLD AUTO: 352 K/UL (ref 164–446)
PMV BLD AUTO: 11.6 FL (ref 9–12.9)
POTASSIUM SERPL-SCNC: 3.6 MMOL/L (ref 3.6–5.5)
RBC # BLD AUTO: 4.83 M/UL (ref 4.7–6.1)
SODIUM SERPL-SCNC: 138 MMOL/L (ref 135–145)
WBC # BLD AUTO: 9.4 K/UL (ref 4.8–10.8)

## 2018-01-17 PROCEDURE — 700117 HCHG RX CONTRAST REV CODE 255: Performed by: INTERNAL MEDICINE

## 2018-01-17 PROCEDURE — 80048 BASIC METABOLIC PNL TOTAL CA: CPT

## 2018-01-17 PROCEDURE — 72157 MRI CHEST SPINE W/O & W/DYE: CPT

## 2018-01-17 PROCEDURE — A9270 NON-COVERED ITEM OR SERVICE: HCPCS | Performed by: FAMILY MEDICINE

## 2018-01-17 PROCEDURE — A9579 GAD-BASE MR CONTRAST NOS,1ML: HCPCS | Performed by: INTERNAL MEDICINE

## 2018-01-17 PROCEDURE — G0378 HOSPITAL OBSERVATION PER HR: HCPCS

## 2018-01-17 PROCEDURE — 99225 PR SUBSEQUENT OBSERVATION CARE,LEVEL II: CPT | Performed by: INTERNAL MEDICINE

## 2018-01-17 PROCEDURE — 700102 HCHG RX REV CODE 250 W/ 637 OVERRIDE(OP): Performed by: FAMILY MEDICINE

## 2018-01-17 PROCEDURE — 72158 MRI LUMBAR SPINE W/O & W/DYE: CPT

## 2018-01-17 PROCEDURE — 700111 HCHG RX REV CODE 636 W/ 250 OVERRIDE (IP): Performed by: INTERNAL MEDICINE

## 2018-01-17 PROCEDURE — 36415 COLL VENOUS BLD VENIPUNCTURE: CPT

## 2018-01-17 PROCEDURE — 85027 COMPLETE CBC AUTOMATED: CPT

## 2018-01-17 PROCEDURE — 72156 MRI NECK SPINE W/O & W/DYE: CPT

## 2018-01-17 RX ORDER — DEXAMETHASONE SODIUM PHOSPHATE 4 MG/ML
4 INJECTION, SOLUTION INTRA-ARTICULAR; INTRALESIONAL; INTRAMUSCULAR; INTRAVENOUS; SOFT TISSUE EVERY 6 HOURS
Status: DISCONTINUED | OUTPATIENT
Start: 2018-01-17 | End: 2018-01-21 | Stop reason: HOSPADM

## 2018-01-17 RX ADMIN — DEXAMETHASONE SODIUM PHOSPHATE 4 MG: 4 INJECTION, SOLUTION INTRAMUSCULAR; INTRAVENOUS at 11:23

## 2018-01-17 RX ADMIN — GABAPENTIN 300 MG: 300 CAPSULE ORAL at 09:00

## 2018-01-17 RX ADMIN — ACETAMINOPHEN 650 MG: 325 TABLET, FILM COATED ORAL at 07:18

## 2018-01-17 RX ADMIN — LEVOTHYROXINE SODIUM 50 MCG: 50 TABLET ORAL at 05:21

## 2018-01-17 RX ADMIN — GABAPENTIN 300 MG: 300 CAPSULE ORAL at 22:10

## 2018-01-17 RX ADMIN — GADODIAMIDE 20 ML: 287 INJECTION INTRAVENOUS at 09:02

## 2018-01-17 RX ADMIN — GEMFIBROZIL 600 MG: 600 TABLET ORAL at 07:58

## 2018-01-17 RX ADMIN — LAMOTRIGINE 100 MG: 100 TABLET ORAL at 07:58

## 2018-01-17 RX ADMIN — DEXAMETHASONE SODIUM PHOSPHATE 4 MG: 4 INJECTION, SOLUTION INTRAMUSCULAR; INTRAVENOUS at 17:55

## 2018-01-17 RX ADMIN — GABAPENTIN 300 MG: 300 CAPSULE ORAL at 17:56

## 2018-01-17 RX ADMIN — GEMFIBROZIL 600 MG: 600 TABLET ORAL at 20:31

## 2018-01-17 RX ADMIN — VITAMIN D, TAB 1000IU (100/BT) 2000 UNITS: 25 TAB at 07:58

## 2018-01-17 ASSESSMENT — PAIN SCALES - GENERAL
PAINLEVEL_OUTOF10: 0
PAINLEVEL_OUTOF10: 0

## 2018-01-17 ASSESSMENT — ENCOUNTER SYMPTOMS
NAUSEA: 0
MYALGIAS: 1
PALPITATIONS: 0
FEVER: 0
FOCAL WEAKNESS: 1
HEADACHES: 0
HEARTBURN: 0
BLURRED VISION: 0
CHILLS: 0
DIZZINESS: 0

## 2018-01-17 NOTE — PROGRESS NOTES
[facility] Hospitalist Progress Note    Date of Service: 2018    Chief Complaint  47 y.o. male admitted 1/15/2018 with b/l lower extremity weakness     Interval Problem Update  Pt seen and examined, afebrile, no overnight events, MRI cervical spine was done showing   Focal cord compression at C3-C4 level.   Neurosurgery was consulted appreciate rec.   Consultants/Specialty  Neurology  Neurosurgery   Disposition  TBD         Review of Systems   Constitutional: Negative for chills and fever.   HENT: Negative for hearing loss.    Eyes: Negative for blurred vision.   Cardiovascular: Negative for chest pain and palpitations.   Gastrointestinal: Negative for heartburn and nausea.   Musculoskeletal: Positive for myalgias.   Skin: Negative for rash.   Neurological: Positive for focal weakness. Negative for dizziness and headaches.      Physical Exam  Laboratory/Imaging   Hemodynamics  Temp (24hrs), Av.7 °C (98 °F), Min:36.4 °C (97.5 °F), Max:36.9 °C (98.4 °F)   Temperature: 36.9 °C (98.4 °F)  Pulse  Av.2  Min: 60  Max: 106   Blood Pressure: 139/99      Respiratory      Respiration: 18, Pulse Oximetry: 92 %             Fluids    Intake/Output Summary (Last 24 hours) at 18 1502  Last data filed at 18 1800   Gross per 24 hour   Intake              500 ml   Output                0 ml   Net              500 ml       Nutrition  Orders Placed This Encounter   Procedures   • Diet Order     Standing Status:   Standing     Number of Occurrences:   1     Order Specific Question:   Diet:     Answer:   Regular [1]   • DIET NPO     Standing Status:   Standing     Number of Occurrences:   8     Order Specific Question:   Restrict to:     Answer:   Strict [1]     Physical Exam   Constitutional: He is oriented to person, place, and time.   HENT:   Head: Normocephalic and atraumatic.   Eyes: Conjunctivae are normal. No scleral icterus.   Neck: Neck supple. No JVD present.   Cardiovascular: Normal heart sounds.  Exam  reveals no gallop.    Pulmonary/Chest: Effort normal and breath sounds normal. He has no wheezes. He has no rales.   Abdominal: Soft. Bowel sounds are normal. He exhibits no distension. There is no tenderness.   Musculoskeletal: He exhibits no edema.   Neurological: He is alert and oriented to person, place, and time.   MMT 5/5 except LUE 4/5   Skin: Skin is warm and dry. No rash noted. No erythema.       Recent Labs      01/15/18   1345  01/16/18   0243  01/17/18   0314   WBC  9.3  7.8  9.4   RBC  4.96  4.63*  4.83   HEMOGLOBIN  15.5  14.0  14.7   HEMATOCRIT  44.1  41.0*  43.2   MCV  88.9  88.6  89.4   MCH  31.3  30.2  30.4   MCHC  35.1  34.1  34.0   RDW  40.3  40.5  41.8   PLATELETCT  361  364  352   MPV  10.7  11.5  11.6     Recent Labs      01/15/18   1345  01/16/18   0243  01/17/18   0314   SODIUM  136  138  138   POTASSIUM  4.1  3.5*  3.6   CHLORIDE  104  105  104   CO2  22  24  24   GLUCOSE  92  98  90   BUN  15  16  18   CREATININE  0.98  0.99  0.88   CALCIUM  9.9  9.1  9.5                      Assessment/Plan     * Weakness of extremity- (present on admission)   Assessment & Plan    MRI outpt showing some demyelination, neurology consulted, rec to MRI of C/T/L spine, appreciate rec.   MRI C spine was done today 1/17/18 showing focal cord compression at C3-C4, and neurosurgery was consulted appreciate rec.   Pt started on decadron           HTN (hypertension)- (present on admission)   Assessment & Plan    IV Vasotec and labetalol as needed for now  Check echocardiogram        Hypothyroid- (present on admission)   Assessment & Plan    Continue levothyroxine, check TSH        Bipolar disorder (CMS-HCC)- (present on admission)   Assessment & Plan    Continue Lamictal            Reviewed items::  Labs reviewed, Radiology images reviewed and Medications reviewed  Lowe catheter::  No Lowe  DVT prophylaxis pharmacological::  Heparin

## 2018-01-17 NOTE — PROGRESS NOTES
Called MRI team. Kathy from MRI states MRI (s) will have to be done tomorrow, because he's further down on the list.

## 2018-01-17 NOTE — CONSULTS
Neurosurgery Consult Note    Patient: Santos Layne    MRN: [id number]    Date of Consultation: 1/17/2018    Reason for Consultation: Cervical spondylotic myelopathy    Referring Physician: Dr. Willis Alarcon    History of Present Illness:  Santos Layne is a 47 y.o. right-handed male who presented 1/15/2018 with weakness of extremities as well as intermittent numbness and tingling which has been ongoing for the past 6 months. For the past 3 months as become more pronounced. He has had episodes where his legs suddenly gave out and he would fall to the ground. There was no loss of consciousness or syncopal episodes. He denies having any headache dizziness or lightheadedness, chest pain palpitations shortness breath. Has also had numbness and feeling primarily of his hands and fingers. He has also dropped things in his hand more on the left than on the right. He has had workup with MRI of the brain as well as cervical and lumbar spines which which showed possible demyelination changes. He not been able see a neurologist. Today his legs gave way again and he fell down. And then presented the emergency room. It was also noted to be quite hypertensive here in the emergency room. Denies any history of hypertension states that his blood pressure will go up when he is nervous.    Review of Systems:  Constitutional: Denies fevers, chills, night sweats, or weight changes  Eyes: Denies changes in vision, or eye pain  Ears/Nose/Throat/Mouth: Denies nasal congestion or sore throat   Cardiovascular: Denies chest pain or palpitations   Respiratory: Denies shortness of breath, or cough  Gastrointestinal/Hepatic: Denies abdominal pain, nausea, vomiting, diarrhea, or constipation  Genitourinary: Denies dysuria, frequency, or incontinence  Musculoskeletal/Rheum: Denies joint pain or swelling   Skin: Denies rash  Neurological: Denies headache, confusion, memory loss  Psychiatric: Denies depression   Endocrine: Denies hx of  diabetes  Heme/Oncology/Lymph Nodes: Denies enlarged lymph nodes, bruising, or known bleeding disorder  Allergic/Immunologic: Denies hx of autoimmune disorder    Medications:  Current Facility-Administered Medications   Medication Dose Route Frequency Provider Last Rate Last Dose   • dexamethasone (DECADRON) injection 4 mg  4 mg Intravenous Q6HRS Willis Alarcon M.D.   4 mg at 01/17/18 1123   • cyclobenzaprine (FLEXERIL) tablet 10 mg  10 mg Oral TID PRN Juan José Ley M.D.       • gabapentin (NEURONTIN) capsule 300 mg  300 mg Oral TID Juan José Ley M.D.   300 mg at 01/17/18 0900   • gemfibrozil (LOPID) tablet 600 mg  600 mg Oral BID Juan José Ley M.D.   600 mg at 01/17/18 0758   • hydrOXYzine HCl (ATARAX) tablet 50 mg  50 mg Oral TID PRN Juan José Ley M.D.       • lamotrigine (LAMICTAL) tablet 100 mg  100 mg Oral DAILY Juan José Ley M.D.   100 mg at 01/17/18 0758   • levothyroxine (SYNTHROID) tablet 50 mcg  50 mcg Oral AM ES Juan José Ley M.D.   50 mcg at 01/17/18 0521   • vitamin D (cholecalciferol) tablet 2,000 Units  2,000 Units Oral DAILY Juan José Ley M.D.   2,000 Units at 01/17/18 0758   • senna-docusate (PERICOLACE or SENOKOT S) 8.6-50 MG per tablet 2 Tab  2 Tab Oral BID Juan José eLy M.D.   2 Tab at 01/15/18 2141    And   • polyethylene glycol/lytes (MIRALAX) PACKET 1 Packet  1 Packet Oral QDAY PRN Juan José Ley M.D.        And   • magnesium hydroxide (MILK OF MAGNESIA) suspension 30 mL  30 mL Oral QDAY PRN Juan José Ley M.D.        And   • bisacodyl (DULCOLAX) suppository 10 mg  10 mg Rectal QDAY PRN Can Liwanag, M.D.       • enoxaparin (LOVENOX) inj 40 mg  40 mg Subcutaneous DAILY Juan José Ley M.D.       • ondansetron (ZOFRAN) syringe/vial injection 4 mg  4 mg Intravenous Q4HRS PRN Juan José Ley M.D.       • ondansetron (ZOFRAN ODT) dispertab 4 mg  4 mg Oral Q4HRS PRN Juan José Ley M.D.       • promethazine (PHENERGAN) tablet 12.5-25 mg  12.5-25 mg Oral  Q4HRS PRPAL Ley M.D.       • promethazine (PHENERGAN) suppository 12.5-25 mg  12.5-25 mg Rectal Q4HRS PRPAL Ley M.D.       • prochlorperazine (COMPAZINE) injection 5-10 mg  5-10 mg Intravenous Q4HRS PRPAL Ley M.D.       • acetaminophen (TYLENOL) tablet 650 mg  650 mg Oral Q6HRS PRPAL Ley M.D.   650 mg at 01/17/18 0718   • enalaprilat (VASOTEC) injection 1.25 mg  1.25 mg Intravenous Q6HRS PRPAL Lye M.D.       • labetalol (NORMODYNE,TRANDATE) injection 10 mg  10 mg Intravenous Q6HRS PRPAL Ley M.D.           Allergies:  Allergies   Allergen Reactions   • Pcn [Penicillins] Unspecified     Mother stated he had a reaction as a baby       Past Medical History:  Past Medical History:   Diagnosis Date   • Arthritis    • Back pain    • Cataract    • Disorder of thyroid    • Fall    • HTN (hypertension) 1/15/2018   • Psychiatric problem     Bipolar medication       Past Surgical History:  History reviewed. No pertinent surgical history.    Family History:  Family History   Problem Relation Age of Onset   • Diabetes Mother    • Heart Disease Mother    • Hypertension Mother    • Hyperlipidemia Mother    • Lung Disease Father    • Cancer Father    • Hypertension Father    • Hyperlipidemia Father    • Cancer Paternal Aunt        Social History:  Social History     Social History   • Marital status: N/A     Spouse name: N/A   • Number of children: N/A   • Years of education: N/A     Occupational History   • Not on file.     Social History Main Topics   • Smoking status: Former Smoker   • Smokeless tobacco: Former User     Quit date: 1/15/2016   • Alcohol use No   • Drug use: No   • Sexual activity: Not on file     Other Topics Concern   • Not on file     Social History Narrative   • No narrative on file       Physical Examination:  Patient is alert and oriented  Poor dentition is noted  Cranial nerve examination unremarkable  There is diffuse weakness in the  upper extremities bilaterally, left greater than right  Deltoids 4+/5 on the right, 4-/5 on the left  Biceps 4+/5 on the right, 4-/5 on the left  Triceps 4+/5 on the right, 4+/5 on the left  Handgrips 4+/5 on the right, 4-/5 on the left  Legs 5/5 bilaterally  There is subjective diminished sensation in the left hand compared to the right, but this is apparently better than it was before starting steroids  Reflexes 3/4 in the upper and lower extremities bilaterally  Gait slightly ataxic and broad-based but otherwise normal    Labs:  Recent Labs      01/15/18   1345  01/16/18   0243  01/17/18   0314   WBC  9.3  7.8  9.4   RBC  4.96  4.63*  4.83   HEMOGLOBIN  15.5  14.0  14.7   HEMATOCRIT  44.1  41.0*  43.2   MCV  88.9  88.6  89.4   MCH  31.3  30.2  30.4   MCHC  35.1  34.1  34.0   RDW  40.3  40.5  41.8   PLATELETCT  361  364  352   MPV  10.7  11.5  11.6     Recent Labs      01/15/18   1345  01/16/18   0243  01/17/18   0314   SODIUM  136  138  138   POTASSIUM  4.1  3.5*  3.6   CHLORIDE  104  105  104   CO2  22  24  24   GLUCOSE  92  98  90   BUN  15  16  18   CREATININE  0.98  0.99  0.88   CALCIUM  9.9  9.1  9.5               Imaging:  Cervical spondylosis primarily at C3-C4 with disc bulging and hypertrophy of the ligamentum flavum causing severe cord compression and intrinsic cord signal changes. There is cervical spondylosis in the lower cervical spine without spinal cord compression. There is also spondylosis of the lumbar spine with foraminal stenosis greater on the left than on the right.    Assessment and Plan:  Santos Layne presents with cervical spondylotic myelopathy affecting the left side more than the right due to focal cord compression at C3-C4. I would recommend cervical laminectomies at C3-C4 with posterior lateral instrumentation and fusion to stabilize this level.  Spondylosis is probably due to micro-instability at C3-C4, therefore I would recommend fusion at this level to stabilize it. The risks,  benefits, alternatives to surgery were discussed with the patient and informed consent was obtained. Surgery is scheduled for Friday, January 19, 2018.      Noah Trinidad M.D.

## 2018-01-17 NOTE — PROGRESS NOTES
NEUROLOGY PROGRESS NOTE      Background:  47 y.o. male was admitted on 1/15/2018  4:39 PM for Weakness of extremity.  He is being followed by Neurology for unsteady gait and numbness in hands.    SUBJECTIVE: No significant changes, continue with numbness in hand and spasm of his left hand.    NEUROLOGICAL EXAM:   He is awake, alert, fully oriented.  Speech and memory within   normal limits.  Facial motor and sensation intact.  Extraocular movements are   full.  Visual fields are full to confrontation.  Hearing is intact to finger   rub bilaterally.  Tongue midline and protrudes symmetrically.  Palate elevates   symmetrically.  Shoulder shrugs are normal.  Motor examination revealed   normal strength to direct testing of both upper and lower extremity, proximal   and distal, although he has some increased tone in his left hand and his   finger with curl down and spasm at that time.  He has a subjective sense of   pins and needle in both hands.  He has some numbness and paresthesia in both   feet as well.  Coordination is intact finger-to-nose and heel-to-shin testing.    Deep tendon reflexes are 2+ and equal.  I stood him up and walk him in the   hallway and he was able to walk without assistant, but he tells me at that   time, his leg would give away and he would fall.    OBJECTIVE:     NEUROIMAGING:    MRI of cervical spine with and without contrast 1/17/18:  1.  Within the C3-4 level posterior elements there is edema and minimal enhancement which could indicate acute degenerative process, infection or inflammation.  2.  There is cervical cord impingement at C3-4 secondary to disc bulge, endplate spurring and ligamentum flavum hypertrophy. There is abnormal signal intensity within the cord which could indicate edema or myelomalacia.  3.  Multilevel degenerative changes of the cervical spine as described above.        MEDICATIONS:  Current Facility-Administered Medications   Medication Dose   • dexamethasone  (DECADRON) injection 4 mg  4 mg   • cyclobenzaprine (FLEXERIL) tablet 10 mg  10 mg   • gabapentin (NEURONTIN) capsule 300 mg  300 mg   • gemfibrozil (LOPID) tablet 600 mg  600 mg   • hydrOXYzine HCl (ATARAX) tablet 50 mg  50 mg   • lamotrigine (LAMICTAL) tablet 100 mg  100 mg   • levothyroxine (SYNTHROID) tablet 50 mcg  50 mcg   • vitamin D (cholecalciferol) tablet 2,000 Units  2,000 Units   • senna-docusate (PERICOLACE or SENOKOT S) 8.6-50 MG per tablet 2 Tab  2 Tab    And   • polyethylene glycol/lytes (MIRALAX) PACKET 1 Packet  1 Packet    And   • magnesium hydroxide (MILK OF MAGNESIA) suspension 30 mL  30 mL    And   • bisacodyl (DULCOLAX) suppository 10 mg  10 mg   • enoxaparin (LOVENOX) inj 40 mg  40 mg   • ondansetron (ZOFRAN) syringe/vial injection 4 mg  4 mg   • ondansetron (ZOFRAN ODT) dispertab 4 mg  4 mg   • promethazine (PHENERGAN) tablet 12.5-25 mg  12.5-25 mg   • promethazine (PHENERGAN) suppository 12.5-25 mg  12.5-25 mg   • prochlorperazine (COMPAZINE) injection 5-10 mg  5-10 mg   • acetaminophen (TYLENOL) tablet 650 mg  650 mg   • enalaprilat (VASOTEC) injection 1.25 mg  1.25 mg   • labetalol (NORMODYNE,TRANDATE) injection 10 mg  10 mg       Blood pressure 139/99, pulse 85, temperature 36.9 °C (98.4 °F), resp. rate 18, height 1.829 m (6'), weight 97.6 kg (215 lb 2.7 oz), SpO2 92 %.        Recent Labs      01/15/18   1345  01/16/18   0243  01/17/18   0314   WBC  9.3  7.8  9.4   RBC  4.96  4.63*  4.83   HEMOGLOBIN  15.5  14.0  14.7   HEMATOCRIT  44.1  41.0*  43.2   MCV  88.9  88.6  89.4   MCH  31.3  30.2  30.4   MCHC  35.1  34.1  34.0   RDW  40.3  40.5  41.8   PLATELETCT  361  364  352   MPV  10.7  11.5  11.6     Recent Labs      01/15/18   1345  01/16/18   0243  01/17/18   0314   SODIUM  136  138  138   POTASSIUM  4.1  3.5*  3.6   CHLORIDE  104  105  104   CO2  22  24  24   GLUCOSE  92  98  90   BUN  15  16  18       Results for orders placed or performed during the hospital encounter of 01/15/18    ECHOCARDIOGRAM COMP W/O CONT   Result Value Ref Range    Eject.Frac. MOD BP 60.55     Eject.Frac. MOD 4C 67     Eject.Frac. MOD 2C 56.63     Left Ventrical Ejection Fraction 65         ASSESSMENT AND PLAN:   A 47-year-old male with multiple scattered neurological   symptoms such as numbness and paresthesia in hands and feet, spasm of his left   hand, unsteady gait. MRI of cervical spine reviewed as outlined above.   There is cervical cord impingement at C3-4. There is some edema within the cord.  Obtain neurosurgical consultation and  start Decadron.  Neurology will follow as needed, please call us if there is any question.

## 2018-01-17 NOTE — PROGRESS NOTES
Laura Saucedo Fall Risk Assessment:     Last Known Fall: Within the last month  Mobility: Dizziness/generalized weakness  Medications: Cardiovascular or central nervous system meds  Mental Status/LOC/Awareness: Awake, alert, and oriented to date, place, and person  Toileting Needs: No needs  Volume/Electrolyte Status: No problems  Communication/Sensory: Visual (Glasses)/hearing deficit  Behavior: Appropriate behavior  Laura Saucedo Fall Risk Total: 8  Fall Risk Level: LOW RISK    Universal Fall Precautions:  call light/belongings in reach, bed in low position and locked, wheelchairs and assistive devices out of sight, siderails up x 2, use non-slip footwear, adequate lighting, clutter free and spill free environment, educate on level of risk, educate to call for assistance    Fall Risk Level Interventions:   TRIAL (TELE 8, NEURO, MED SAMPSON 5) Low Fall Risk Interventions  Place yellow fall risk ID band on patient: verified  Provide patient/family education based on risk assessment: verified  Educate patient/family to call staff for assistance when getting out of bed: verified  Place fall precaution signage outside patient door: verified      Patient Specific Interventions:     Medication: review medications with patient and family  Mental Status/LOC/Awareness: reinforce falls education and reinforce the use of call light  Toileting: provide frquent toileting  Volume/Electrolyte Status: ensure patient remains hydrated  Communication/Sensory: update plan of care on whiteboard  Behavioral: encourage patient to voice feelings  Mobility: dangle prior to standing and ensure bed is locked and in lowest position

## 2018-01-17 NOTE — DISCHARGE PLANNING
Medical Social Worker    Pt discussed in rounds. Pt is not medically cleared and team is not sure if pt will have SW needs at this time.

## 2018-01-17 NOTE — THERAPY
OT orders received. Per Neuro consult surgery scheduled of 1/19. Defer OT eval at this time until after surgical intervention    Renetta Maynard, OTR/L

## 2018-01-17 NOTE — CONSULTS
DATE OF SERVICE:  01/16/2018    REQUESTING PHYSICIAN:  Willis Alarcon MD    REASON FOR CONSULT:  Recurrent fall, unsteady gait, and numbness suspicious   for demyelinating disease.    HISTORY OF PRESENT ILLNESS:  The patient is a 47-year-old right-handed male   with past medical history significant for hypertension, history of arthritis,   back pain, and bipolar disorder who was admitted yesterday for evaluation of   unsteady gait and fall.  Apparently, he has had weakness and intermittent   numbness and tingling in his legs and hands for past 6 months.  However, for   the past 3 months, his symptom has become more pronounced.  He had some spasms   in his left hand and constantly drop objects from his hand.  He also tells me   his legs would go away at that time and he has had several falls.  He has   been worked up in the past with MRI of the brain and spine and demyelinating   disease was suspected; however, he never seen a neurologist or has been worked   up for that.  He is now complaining of numbness and tingling of his hand.  He   feels that his hands are in sand and he has unsteady gait.  He never had   double vision; however, complained of some blurry vision at that time.    PAST MEDICAL HISTORY:  Hypertension, history of frequent fall and unsteady   gait, history of arthritis, and history of bipolar disorder.    SOCIAL HISTORY:  He is a former smoker.  He quit in 2016.  He has no history   of drinking or drug abuse.    MEDICATIONS:  Reviewed as per MAR.    ALLERGIES:  PENICILLIN.    FAMILY HISTORY:  Reviewed and noncontributory.  There is no history of   demyelinating disease.  No history of premature heart attack or stroke.    REVIEW OF SYSTEMS:  As above.  All other systems are normal.  Please also see   Dr. Juan José Ley's review of systems in his H and P dated 01/15/2018.    PHYSICAL EXAMINATION:  VITAL SIGNS:  Today, heart rate 85, blood pressure 147/99, respirations 18, O2   sat 96% on room air, and  temperature 36.6.  NECK:  Supple, no carotid bruit.  CARDIOVASCULAR:  Regular rate and rhythm.  LUNGS:  Clear.  ABDOMEN:  Soft.  EXTREMITIES:  No cyanosis or clubbing.  NEUROLOGIC:  He is awake, alert, fully oriented.  Speech and memory within   normal limits.  Facial motor and sensation intact.  Extraocular movements are   full.  Visual fields are full to confrontation.  Hearing is intact to finger   rub bilaterally.  Tongue midline and protrudes symmetrically.  Palate elevates   symmetrically.  Shoulder shrugs are normal.  Motor examination revealed   normal strength to direct testing of both upper and lower extremity, proximal   and distal, although he has some increased tone in his left hand and his   finger with curl down and spasm at that time.  He has a subjective sense of   pins and needle in both hands.  He has some numbness and paresthesia in both   feet as well.  Coordination is intact finger-to-nose and heel-to-shin testing.    Deep tendon reflexes are 2+ and equal.  I stood him up and walk him in the   hallway and he was able to walk without assistant, but he tells me at that   time, his leg would give away and he would fall.    ASSESSMENT AND PLAN:  A 47-year-old male with multiple scattered neurological   symptoms such as numbness and paresthesia in hands and feet, spasm of his left   hand, unsteady gait with MRI findings suggestive of demyelinating disease.  I   have reviewed his outside MRI of the brain and there are some nonspecific   small vessel ischemic changes.  However, apparently he had some abnormal MRI   of cervical or lumbar spine as an outpatient.  I have suggested to repeat his   MRI of cervical, thoracic, and lumbar spine with and without contrast and if   there are demyelinating lesion, would suggest a spinal tap and starting IV   Solu-Medrol.  With a spinal tap, we will send CSF and serum for NMO antibody   in addition to MS workup.  He will be evaluated by physical therapy   and  occupational therapy.       ____________________________________     MD ELMER Mejia    DD:  01/16/2018 16:34:50  DT:  01/16/2018 16:59:24    D#:  [id number]  Job#:  [id number]

## 2018-01-18 PROCEDURE — 770001 HCHG ROOM/CARE - MED/SURG/GYN PRIV*

## 2018-01-18 PROCEDURE — 700111 HCHG RX REV CODE 636 W/ 250 OVERRIDE (IP): Performed by: INTERNAL MEDICINE

## 2018-01-18 PROCEDURE — 700102 HCHG RX REV CODE 250 W/ 637 OVERRIDE(OP): Performed by: FAMILY MEDICINE

## 2018-01-18 PROCEDURE — 99232 SBSQ HOSP IP/OBS MODERATE 35: CPT | Performed by: INTERNAL MEDICINE

## 2018-01-18 PROCEDURE — A9270 NON-COVERED ITEM OR SERVICE: HCPCS | Performed by: FAMILY MEDICINE

## 2018-01-18 RX ADMIN — GEMFIBROZIL 600 MG: 600 TABLET ORAL at 08:55

## 2018-01-18 RX ADMIN — GABAPENTIN 300 MG: 300 CAPSULE ORAL at 16:01

## 2018-01-18 RX ADMIN — DEXAMETHASONE SODIUM PHOSPHATE 4 MG: 4 INJECTION, SOLUTION INTRAMUSCULAR; INTRAVENOUS at 12:21

## 2018-01-18 RX ADMIN — LAMOTRIGINE 100 MG: 100 TABLET ORAL at 08:54

## 2018-01-18 RX ADMIN — POLYETHYLENE GLYCOL 3350 1 PACKET: 17 POWDER, FOR SOLUTION ORAL at 12:27

## 2018-01-18 RX ADMIN — GABAPENTIN 300 MG: 300 CAPSULE ORAL at 08:54

## 2018-01-18 RX ADMIN — GEMFIBROZIL 600 MG: 600 TABLET ORAL at 19:37

## 2018-01-18 RX ADMIN — LEVOTHYROXINE SODIUM 50 MCG: 50 TABLET ORAL at 05:15

## 2018-01-18 RX ADMIN — DEXAMETHASONE SODIUM PHOSPHATE 4 MG: 4 INJECTION, SOLUTION INTRAMUSCULAR; INTRAVENOUS at 01:34

## 2018-01-18 RX ADMIN — DEXAMETHASONE SODIUM PHOSPHATE 4 MG: 4 INJECTION, SOLUTION INTRAMUSCULAR; INTRAVENOUS at 18:00

## 2018-01-18 RX ADMIN — MAGNESIUM HYDROXIDE 30 ML: 400 SUSPENSION ORAL at 12:26

## 2018-01-18 RX ADMIN — DEXAMETHASONE SODIUM PHOSPHATE 4 MG: 4 INJECTION, SOLUTION INTRAMUSCULAR; INTRAVENOUS at 05:15

## 2018-01-18 RX ADMIN — VITAMIN D, TAB 1000IU (100/BT) 2000 UNITS: 25 TAB at 08:54

## 2018-01-18 RX ADMIN — GABAPENTIN 300 MG: 300 CAPSULE ORAL at 19:37

## 2018-01-18 ASSESSMENT — ENCOUNTER SYMPTOMS
NAUSEA: 0
CHILLS: 0
FOCAL WEAKNESS: 1
MYALGIAS: 1
BLURRED VISION: 0
FEVER: 0
HEARTBURN: 0
DIZZINESS: 0
HEADACHES: 0
PALPITATIONS: 0

## 2018-01-18 ASSESSMENT — PAIN SCALES - GENERAL
PAINLEVEL_OUTOF10: 0

## 2018-01-18 NOTE — PROGRESS NOTES
[facility] Hospitalist Progress Note    Date of Service: 2018    Chief Complaint  47 y.o. male admitted 1/15/2018 with b/l lower extremity weakness     Interval Problem Update  No overnight events , afebrile, MRI cervical spine was done showing   Focal cord compression at C3-C4 level.   Neurosurgery following, plan for surgery tomorrow appreciate rec.     Consultants/Specialty  Neurology  Neurosurgery   Disposition  TBD         Review of Systems   Constitutional: Negative for chills and fever.   HENT: Negative for hearing loss.    Eyes: Negative for blurred vision.   Cardiovascular: Negative for chest pain and palpitations.   Gastrointestinal: Negative for heartburn and nausea.   Musculoskeletal: Positive for myalgias.   Skin: Negative for rash.   Neurological: Positive for focal weakness. Negative for dizziness and headaches.      Physical Exam  Laboratory/Imaging   Hemodynamics  Temp (24hrs), Av.3 °C (97.3 °F), Min:35.6 °C (96.1 °F), Max:37 °C (98.6 °F)   Temperature: 36.3 °C (97.3 °F)  Pulse  Av  Min: 60  Max: 119   Blood Pressure: 150/89      Respiratory      Respiration: 17, Pulse Oximetry: 95 %             Fluids  No intake or output data in the 24 hours ending 18 1532    Nutrition  Orders Placed This Encounter   Procedures   • Diet Order     Standing Status:   Standing     Number of Occurrences:   1     Order Specific Question:   Diet:     Answer:   Regular [1]   • DIET NPO     Standing Status:   Standing     Number of Occurrences:   8     Order Specific Question:   Restrict to:     Answer:   Strict [1]     Physical Exam   Constitutional: He is oriented to person, place, and time.   HENT:   Head: Normocephalic and atraumatic.   Eyes: Conjunctivae are normal. No scleral icterus.   Neck: Neck supple. No JVD present.   Cardiovascular: Normal heart sounds.  Exam reveals no gallop.    Pulmonary/Chest: Effort normal and breath sounds normal. He has no wheezes. He has no rales.   Abdominal: Soft.  Bowel sounds are normal. He exhibits no distension. There is no tenderness.   Musculoskeletal: He exhibits no edema.   Neurological: He is alert and oriented to person, place, and time.   MMT 5/5 except LUE 4/5   Skin: Skin is warm and dry. No rash noted. No erythema.       Recent Labs      01/16/18   0243  01/17/18   0314   WBC  7.8  9.4   RBC  4.63*  4.83   HEMOGLOBIN  14.0  14.7   HEMATOCRIT  41.0*  43.2   MCV  88.6  89.4   MCH  30.2  30.4   MCHC  34.1  34.0   RDW  40.5  41.8   PLATELETCT  364  352   MPV  11.5  11.6     Recent Labs      01/16/18   0243  01/17/18   0314   SODIUM  138  138   POTASSIUM  3.5*  3.6   CHLORIDE  105  104   CO2  24  24   GLUCOSE  98  90   BUN  16  18   CREATININE  0.99  0.88   CALCIUM  9.1  9.5                      Assessment/Plan     * Weakness of extremity- (present on admission)   Assessment & Plan    MRI outpt showing some demyelination, neurology consulted, rec to MRI of C/T/L spine, appreciate rec.   MRI C spine was done today 1/17/18 showing focal cord compression at C3-C4,  Neurosurgery following, plan for surgery tomorrow appreciate rec.   Cont  on decadron           HTN (hypertension)- (present on admission)   Assessment & Plan    IV Vasotec and labetalol as needed for now  Check echocardiogram        Hypothyroid- (present on admission)   Assessment & Plan    Continue levothyroxine, check TSH        Bipolar disorder (CMS-HCC)- (present on admission)   Assessment & Plan    Continue Lamictal            Reviewed items::  Labs reviewed, Radiology images reviewed and Medications reviewed  Lowe catheter::  No Lowe  DVT prophylaxis pharmacological::  Heparin

## 2018-01-18 NOTE — PROGRESS NOTES
Assumed care of pt at 0700.   Pt is A&O x4.   Pt denies n/v and pain at this time.   Pt reports n/t to bilat hands and fingers.   Medication given per MAR.   Pt is up self to bathroom.   Pt is NPO at 0000 on 1/19.   Plan of care discussed.   All questions answered at this time.

## 2018-01-18 NOTE — PROGRESS NOTES
Laura Saucedo Fall Risk Assessment:     Last Known Fall: Within the last month  Mobility: Dizziness/generalized weakness  Medications: Cardiovascular or central nervous system meds  Mental Status/LOC/Awareness: Awake, alert, and oriented to date, place, and person  Toileting Needs: No needs  Volume/Electrolyte Status: No problems  Communication/Sensory: Visual (Glasses)/hearing deficit  Behavior: Appropriate behavior  Laura Saucedo Fall Risk Total: 8  Fall Risk Level: LOW RISK    Universal Fall Precautions:  call light/belongings in reach, bed in low position and locked, wheelchairs and assistive devices out of sight, use non-slip footwear, siderails up x 2, adequate lighting, clutter free and spill free environment, educate on level of risk, educate to call for assistance    Fall Risk Level Interventions:   TRIAL (TELE 8, NEURO, MED SAMPSON 5) Low Fall Risk Interventions  Place yellow fall risk ID band on patient: verified  Provide patient/family education based on risk assessment: completed  Educate patient/family to call staff for assistance when getting out of bed: completed  Place fall precaution signage outside patient door: verified      Patient Specific Interventions:     Medication: review medications with patient and family  Mental Status/LOC/Awareness: reorient patient, reinforce falls education, encourage family to stay with patient, check on patient hourly, utilize bed/chair fall alarm, reinforce the use of call light and provide activity  Toileting: provide frquent toileting and monitor intake and output/use of appropriate interventions  Volume/Electrolyte Status: ensure patient remains hydrated, advance diet as tolerated, monitor abnormal lab values and ensure IV fluids are appropriate  Communication/Sensory: update plan of care on whiteboard and ensure proper positioning when transferrng/ambulating  Behavioral: engage patient in daily activities, administer medication as ordered, instruct/reinforce fall  program rationale, encourage family to stay with impulsive patients and use appropriate de-escalation techniques  Mobility: provide comfort measures during transport, utilize bed/chair fall alarm, ensure bed is locked and in lowest position and provide appropriate assistive device

## 2018-01-18 NOTE — PROGRESS NOTES
Laura Saucedo Fall Risk Assessment:     Last Known Fall: Within the last month  Mobility: Dizziness/generalized weakness  Medications: Cardiovascular or central nervous system meds  Mental Status/LOC/Awareness: Awake, alert, and oriented to date, place, and person  Toileting Needs: No needs  Volume/Electrolyte Status: No problems  Communication/Sensory: Visual (Glasses)/hearing deficit  Behavior: Appropriate behavior  Laura Saucedo Fall Risk Total: 8  Fall Risk Level: LOW RISK    Universal Fall Precautions:  wheelchairs and assistive devices out of sight, bed in low position and locked, siderails up x 2, use non-slip footwear, call light/belongings in reach, adequate lighting, clutter free and spill free environment, educate on level of risk, educate to call for assistance    Fall Risk Level Interventions:   TRIAL (TELE 8, NEURO, MED SAMPSON 5) Low Fall Risk Interventions  Place yellow fall risk ID band on patient: verified  Provide patient/family education based on risk assessment: completed  Educate patient/family to call staff for assistance when getting out of bed: completed  Place fall precaution signage outside patient door: verified      Patient Specific Interventions:     Medication: review medications with patient and family  Mental Status/LOC/Awareness: reorient patient, reinforce falls education, encourage family to stay with patient, check on patient hourly and reinforce the use of call light  Toileting: instruct male patients prone to dizziness to void while sitting and instruct patient/family on the use of grab bars  Volume/Electrolyte Status: ensure patient remains hydrated, administer medications as ordered for nausea and vomiting, monitor abnormal lab values and teach patients to dangle before rising if hypotensive  Communication/Sensory: update plan of care on whiteboard and ensure patient has glasses/contacts and hearing aids/dentures  Behavioral: encourage patient to voice feelings, engage patient  in daily activities and administer medication as ordered  Mobility: schedule physical activity throughout the day, provide comfort measures during transport, dangle prior to standing and ensure bed is locked and in lowest position

## 2018-01-18 NOTE — CARE PLAN
Problem: Communication  Goal: The ability to communicate needs accurately and effectively will improve  Outcome: PROGRESSING AS EXPECTED      Problem: Safety  Goal: Will remain free from injury  Outcome: PROGRESSING AS EXPECTED      Problem: Infection  Goal: Will remain free from infection  Outcome: PROGRESSING AS EXPECTED

## 2018-01-18 NOTE — PROGRESS NOTES
Laura Saucedo Fall Risk Assessment:     Last Known Fall: Within the last month  Mobility: Dizziness/generalized weakness  Medications: Cardiovascular or central nervous system meds  Mental Status/LOC/Awareness: Awake, alert, and oriented to date, place, and person  Toileting Needs: No needs  Volume/Electrolyte Status: No problems  Communication/Sensory: Visual (Glasses)/hearing deficit  Behavior: Appropriate behavior  Laura Saucedo Fall Risk Total: 8  Fall Risk Level: LOW RISK    Universal Fall Precautions:  call light/belongings in reach, bed in low position and locked, wheelchairs and assistive devices out of sight, use non-slip footwear, siderails up x 2, adequate lighting, clutter free and spill free environment, educate on level of risk, educate to call for assistance    Fall Risk Level Interventions:   TRIAL (TELE 8, NEURO, MED SAMPSON 5) Low Fall Risk Interventions  Place yellow fall risk ID band on patient: verified  Provide patient/family education based on risk assessment: completed  Educate patient/family to call staff for assistance when getting out of bed: completed  Place fall precaution signage outside patient door: verified      Patient Specific Interventions:     Medication: review medications with patient and family  Mental Status/LOC/Awareness: reinforce falls education and reinforce the use of call light  Toileting: provide frquent toileting and monitor intake and output/use of appropriate interventions  Volume/Electrolyte Status: ensure patient remains hydrated  Communication/Sensory: update plan of care on whiteboard  Behavioral: encourage patient to voice feelings  Mobility: dangle prior to standing, ensure bed is locked and in lowest position, provide appropriate assistive device and instruct patient to exit bed on their strongest side

## 2018-01-19 ENCOUNTER — APPOINTMENT (OUTPATIENT)
Dept: RADIOLOGY | Facility: MEDICAL CENTER | Age: 48
DRG: 473 | End: 2018-01-19
Attending: NEUROLOGICAL SURGERY
Payer: MEDICAID

## 2018-01-19 LAB
ANION GAP SERPL CALC-SCNC: 9 MMOL/L (ref 0–11.9)
BUN SERPL-MCNC: 20 MG/DL (ref 8–22)
CALCIUM SERPL-MCNC: 9.9 MG/DL (ref 8.5–10.5)
CHLORIDE SERPL-SCNC: 99 MMOL/L (ref 96–112)
CO2 SERPL-SCNC: 25 MMOL/L (ref 20–33)
CREAT SERPL-MCNC: 0.84 MG/DL (ref 0.5–1.4)
ERYTHROCYTE [DISTWIDTH] IN BLOOD BY AUTOMATED COUNT: 41.1 FL (ref 35.9–50)
GLUCOSE SERPL-MCNC: 122 MG/DL (ref 65–99)
HCT VFR BLD AUTO: 41 % (ref 42–52)
HGB BLD-MCNC: 13.9 G/DL (ref 14–18)
MCH RBC QN AUTO: 30.3 PG (ref 27–33)
MCHC RBC AUTO-ENTMCNC: 33.9 G/DL (ref 33.7–35.3)
MCV RBC AUTO: 89.5 FL (ref 81.4–97.8)
PLATELET # BLD AUTO: 362 K/UL (ref 164–446)
PMV BLD AUTO: 11.4 FL (ref 9–12.9)
POTASSIUM SERPL-SCNC: 4 MMOL/L (ref 3.6–5.5)
RBC # BLD AUTO: 4.58 M/UL (ref 4.7–6.1)
SODIUM SERPL-SCNC: 133 MMOL/L (ref 135–145)
WBC # BLD AUTO: 15.3 K/UL (ref 4.8–10.8)

## 2018-01-19 PROCEDURE — 770001 HCHG ROOM/CARE - MED/SURG/GYN PRIV*

## 2018-01-19 PROCEDURE — 160002 HCHG RECOVERY MINUTES (STAT): Performed by: NEUROLOGICAL SURGERY

## 2018-01-19 PROCEDURE — 95939 C MOTOR EVOKED UPR&LWR LIMBS: CPT | Performed by: NEUROLOGICAL SURGERY

## 2018-01-19 PROCEDURE — 700101 HCHG RX REV CODE 250

## 2018-01-19 PROCEDURE — A9270 NON-COVERED ITEM OR SERVICE: HCPCS | Performed by: NEUROLOGICAL SURGERY

## 2018-01-19 PROCEDURE — 36415 COLL VENOUS BLD VENIPUNCTURE: CPT

## 2018-01-19 PROCEDURE — 502240 HCHG MISC OR SUPPLY RC 0272: Performed by: NEUROLOGICAL SURGERY

## 2018-01-19 PROCEDURE — 160048 HCHG OR STATISTICAL LEVEL 1-5: Performed by: NEUROLOGICAL SURGERY

## 2018-01-19 PROCEDURE — A9270 NON-COVERED ITEM OR SERVICE: HCPCS

## 2018-01-19 PROCEDURE — 160009 HCHG ANES TIME/MIN: Performed by: NEUROLOGICAL SURGERY

## 2018-01-19 PROCEDURE — 700111 HCHG RX REV CODE 636 W/ 250 OVERRIDE (IP): Performed by: INTERNAL MEDICINE

## 2018-01-19 PROCEDURE — 72020 X-RAY EXAM OF SPINE 1 VIEW: CPT

## 2018-01-19 PROCEDURE — 700111 HCHG RX REV CODE 636 W/ 250 OVERRIDE (IP)

## 2018-01-19 PROCEDURE — 700102 HCHG RX REV CODE 250 W/ 637 OVERRIDE(OP)

## 2018-01-19 PROCEDURE — 95938 SOMATOSENSORY TESTING: CPT | Performed by: NEUROLOGICAL SURGERY

## 2018-01-19 PROCEDURE — 95861 NEEDLE EMG 2 EXTREMITIES: CPT | Performed by: NEUROLOGICAL SURGERY

## 2018-01-19 PROCEDURE — 700102 HCHG RX REV CODE 250 W/ 637 OVERRIDE(OP): Performed by: FAMILY MEDICINE

## 2018-01-19 PROCEDURE — 501838 HCHG SUTURE GENERAL: Performed by: NEUROLOGICAL SURGERY

## 2018-01-19 PROCEDURE — 95937 NEUROMUSCULAR JUNCTION TEST: CPT | Performed by: NEUROLOGICAL SURGERY

## 2018-01-19 PROCEDURE — 700105 HCHG RX REV CODE 258: Performed by: NEUROLOGICAL SURGERY

## 2018-01-19 PROCEDURE — A9270 NON-COVERED ITEM OR SERVICE: HCPCS | Performed by: FAMILY MEDICINE

## 2018-01-19 PROCEDURE — 500331 HCHG COTTONOID, SURG PATTIE: Performed by: NEUROLOGICAL SURGERY

## 2018-01-19 PROCEDURE — C1776 JOINT DEVICE (IMPLANTABLE): HCPCS | Performed by: NEUROLOGICAL SURGERY

## 2018-01-19 PROCEDURE — 99232 SBSQ HOSP IP/OBS MODERATE 35: CPT | Performed by: INTERNAL MEDICINE

## 2018-01-19 PROCEDURE — 160036 HCHG PACU - EA ADDL 30 MINS PHASE I: Performed by: NEUROLOGICAL SURGERY

## 2018-01-19 PROCEDURE — 502000 HCHG MISC OR IMPLANTS RC 0278: Performed by: NEUROLOGICAL SURGERY

## 2018-01-19 PROCEDURE — 80048 BASIC METABOLIC PNL TOTAL CA: CPT

## 2018-01-19 PROCEDURE — 110454 HCHG SHELL REV 250: Performed by: NEUROLOGICAL SURGERY

## 2018-01-19 PROCEDURE — 500122 HCHG BOVIE, BLADE: Performed by: NEUROLOGICAL SURGERY

## 2018-01-19 PROCEDURE — C1713 ANCHOR/SCREW BN/BN,TIS/BN: HCPCS | Performed by: NEUROLOGICAL SURGERY

## 2018-01-19 PROCEDURE — 00NW0ZZ RELEASE CERVICAL SPINAL CORD, OPEN APPROACH: ICD-10-PCS | Performed by: NEUROLOGICAL SURGERY

## 2018-01-19 PROCEDURE — 160035 HCHG PACU - 1ST 60 MINS PHASE I: Performed by: NEUROLOGICAL SURGERY

## 2018-01-19 PROCEDURE — 85027 COMPLETE CBC AUTOMATED: CPT

## 2018-01-19 PROCEDURE — 95940 IONM IN OPERATNG ROOM 15 MIN: CPT | Performed by: NEUROLOGICAL SURGERY

## 2018-01-19 PROCEDURE — 700102 HCHG RX REV CODE 250 W/ 637 OVERRIDE(OP): Performed by: NEUROLOGICAL SURGERY

## 2018-01-19 PROCEDURE — 500075 HCHG BLADE, CLIPPER NEURO: Performed by: NEUROLOGICAL SURGERY

## 2018-01-19 PROCEDURE — 0RG1071 FUSION OF CERVICAL VERTEBRAL JOINT WITH AUTOLOGOUS TISSUE SUBSTITUTE, POSTERIOR APPROACH, POSTERIOR COLUMN, OPEN APPROACH: ICD-10-PCS | Performed by: NEUROLOGICAL SURGERY

## 2018-01-19 PROCEDURE — 160042 HCHG SURGERY MINUTES - EA ADDL 1 MIN LEVEL 5: Performed by: NEUROLOGICAL SURGERY

## 2018-01-19 PROCEDURE — 700112 HCHG RX REV CODE 229: Performed by: NEUROLOGICAL SURGERY

## 2018-01-19 PROCEDURE — 160031 HCHG SURGERY MINUTES - 1ST 30 MINS LEVEL 5: Performed by: NEUROLOGICAL SURGERY

## 2018-01-19 DEVICE — DBM PUTTY PROGENIX 0.5CC: Type: IMPLANTABLE DEVICE | Status: FUNCTIONAL

## 2018-01-19 DEVICE — SCREW POLYAXIAL CANCELLOUS TITANIUM 3.5X14 MM (1TCONX12=12): Type: IMPLANTABLE DEVICE | Status: FUNCTIONAL

## 2018-01-19 DEVICE — ROD CURVED TITANIUM 4.0MM X 30MM: Type: IMPLANTABLE DEVICE | Status: FUNCTIONAL

## 2018-01-19 DEVICE — SCREW TITANIUM LOCKING (1TCONX24=24): Type: IMPLANTABLE DEVICE | Status: FUNCTIONAL

## 2018-01-19 RX ORDER — AMOXICILLIN 250 MG
1 CAPSULE ORAL NIGHTLY
Status: DISCONTINUED | OUTPATIENT
Start: 2018-01-19 | End: 2018-01-21 | Stop reason: HOSPADM

## 2018-01-19 RX ORDER — ENEMA 19; 7 G/133ML; G/133ML
1 ENEMA RECTAL
Status: DISCONTINUED | OUTPATIENT
Start: 2018-01-19 | End: 2018-01-21 | Stop reason: HOSPADM

## 2018-01-19 RX ORDER — HYDROMORPHONE HYDROCHLORIDE 2 MG/ML
0.5 INJECTION, SOLUTION INTRAMUSCULAR; INTRAVENOUS; SUBCUTANEOUS
Status: DISCONTINUED | OUTPATIENT
Start: 2018-01-19 | End: 2018-01-21 | Stop reason: HOSPADM

## 2018-01-19 RX ORDER — ONDANSETRON 2 MG/ML
4 INJECTION INTRAMUSCULAR; INTRAVENOUS EVERY 4 HOURS PRN
Status: DISCONTINUED | OUTPATIENT
Start: 2018-01-19 | End: 2018-01-21 | Stop reason: HOSPADM

## 2018-01-19 RX ORDER — OXYCODONE HYDROCHLORIDE 5 MG/1
5 TABLET ORAL
Status: DISCONTINUED | OUTPATIENT
Start: 2018-01-19 | End: 2018-01-21 | Stop reason: HOSPADM

## 2018-01-19 RX ORDER — SODIUM CHLORIDE, SODIUM LACTATE, POTASSIUM CHLORIDE, AND CALCIUM CHLORIDE .6; .31; .03; .02 G/100ML; G/100ML; G/100ML; G/100ML
IRRIGANT IRRIGATION
Status: DISCONTINUED | OUTPATIENT
Start: 2018-01-19 | End: 2018-01-19 | Stop reason: HOSPADM

## 2018-01-19 RX ORDER — AMOXICILLIN 250 MG
1 CAPSULE ORAL
Status: DISCONTINUED | OUTPATIENT
Start: 2018-01-19 | End: 2018-01-21 | Stop reason: HOSPADM

## 2018-01-19 RX ORDER — METHOCARBAMOL 750 MG/1
750 TABLET, FILM COATED ORAL EVERY 8 HOURS PRN
Status: DISCONTINUED | OUTPATIENT
Start: 2018-01-19 | End: 2018-01-21 | Stop reason: HOSPADM

## 2018-01-19 RX ORDER — SODIUM CHLORIDE 9 MG/ML
INJECTION, SOLUTION INTRAVENOUS CONTINUOUS
Status: DISCONTINUED | OUTPATIENT
Start: 2018-01-19 | End: 2018-01-21

## 2018-01-19 RX ORDER — POLYETHYLENE GLYCOL 3350 17 G/17G
1 POWDER, FOR SOLUTION ORAL 2 TIMES DAILY PRN
Status: DISCONTINUED | OUTPATIENT
Start: 2018-01-19 | End: 2018-01-21 | Stop reason: HOSPADM

## 2018-01-19 RX ORDER — OXYCODONE HCL 5 MG/5 ML
SOLUTION, ORAL ORAL
Status: COMPLETED
Start: 2018-01-19 | End: 2018-01-19

## 2018-01-19 RX ORDER — ACETAMINOPHEN 500 MG
1000 TABLET ORAL EVERY 6 HOURS
Status: DISCONTINUED | OUTPATIENT
Start: 2018-01-19 | End: 2018-01-21 | Stop reason: HOSPADM

## 2018-01-19 RX ORDER — PROMETHAZINE HYDROCHLORIDE 25 MG/1
12.5-25 SUPPOSITORY RECTAL EVERY 4 HOURS PRN
Status: DISCONTINUED | OUTPATIENT
Start: 2018-01-19 | End: 2018-01-21 | Stop reason: HOSPADM

## 2018-01-19 RX ORDER — DOCUSATE SODIUM 100 MG/1
100 CAPSULE, LIQUID FILLED ORAL 2 TIMES DAILY
Status: DISCONTINUED | OUTPATIENT
Start: 2018-01-19 | End: 2018-01-21 | Stop reason: HOSPADM

## 2018-01-19 RX ORDER — BACITRACIN 50000 [IU]/1
INJECTION, POWDER, FOR SOLUTION INTRAMUSCULAR
Status: DISCONTINUED | OUTPATIENT
Start: 2018-01-19 | End: 2018-01-19 | Stop reason: HOSPADM

## 2018-01-19 RX ORDER — ONDANSETRON 4 MG/1
4 TABLET, ORALLY DISINTEGRATING ORAL EVERY 4 HOURS PRN
Status: DISCONTINUED | OUTPATIENT
Start: 2018-01-19 | End: 2018-01-21 | Stop reason: HOSPADM

## 2018-01-19 RX ORDER — ACETAMINOPHEN 500 MG
TABLET ORAL
Status: DISPENSED
Start: 2018-01-19 | End: 2018-01-19

## 2018-01-19 RX ORDER — PROMETHAZINE HYDROCHLORIDE 25 MG/1
12.5-25 TABLET ORAL EVERY 4 HOURS PRN
Status: DISCONTINUED | OUTPATIENT
Start: 2018-01-19 | End: 2018-01-21 | Stop reason: HOSPADM

## 2018-01-19 RX ORDER — OXYCODONE HYDROCHLORIDE 10 MG/1
10 TABLET ORAL
Status: DISCONTINUED | OUTPATIENT
Start: 2018-01-19 | End: 2018-01-21 | Stop reason: HOSPADM

## 2018-01-19 RX ORDER — BISACODYL 10 MG
10 SUPPOSITORY, RECTAL RECTAL
Status: DISCONTINUED | OUTPATIENT
Start: 2018-01-19 | End: 2018-01-21 | Stop reason: HOSPADM

## 2018-01-19 RX ORDER — GABAPENTIN 300 MG/1
CAPSULE ORAL
Status: DISPENSED
Start: 2018-01-19 | End: 2018-01-19

## 2018-01-19 RX ORDER — BACITRACIN ZINC 500 [USP'U]/G
OINTMENT TOPICAL
Status: DISCONTINUED | OUTPATIENT
Start: 2018-01-19 | End: 2018-01-19 | Stop reason: HOSPADM

## 2018-01-19 RX ADMIN — DOCUSATE SODIUM 100 MG: 100 CAPSULE ORAL at 19:46

## 2018-01-19 RX ADMIN — DEXAMETHASONE SODIUM PHOSPHATE 4 MG: 4 INJECTION, SOLUTION INTRAMUSCULAR; INTRAVENOUS at 00:22

## 2018-01-19 RX ADMIN — OXYCODONE HYDROCHLORIDE 10 MG: 10 TABLET ORAL at 19:47

## 2018-01-19 RX ADMIN — GABAPENTIN 300 MG: 300 CAPSULE ORAL at 19:47

## 2018-01-19 RX ADMIN — DEXAMETHASONE SODIUM PHOSPHATE 4 MG: 4 INJECTION, SOLUTION INTRAMUSCULAR; INTRAVENOUS at 18:14

## 2018-01-19 RX ADMIN — SODIUM CHLORIDE: 9 INJECTION, SOLUTION INTRAVENOUS at 16:50

## 2018-01-19 RX ADMIN — DEXAMETHASONE SODIUM PHOSPHATE 4 MG: 4 INJECTION, SOLUTION INTRAMUSCULAR; INTRAVENOUS at 05:38

## 2018-01-19 RX ADMIN — OXYCODONE HYDROCHLORIDE 5 MG: 5 SOLUTION ORAL at 15:30

## 2018-01-19 RX ADMIN — METHOCARBAMOL 750 MG: 750 TABLET ORAL at 19:47

## 2018-01-19 RX ADMIN — STANDARDIZED SENNA CONCENTRATE AND DOCUSATE SODIUM 1 TABLET: 8.6; 5 TABLET, FILM COATED ORAL at 19:47

## 2018-01-19 RX ADMIN — GEMFIBROZIL 600 MG: 600 TABLET ORAL at 22:57

## 2018-01-19 RX ADMIN — ACETAMINOPHEN 1000 MG: 500 TABLET, FILM COATED ORAL at 18:14

## 2018-01-19 ASSESSMENT — ENCOUNTER SYMPTOMS
NAUSEA: 0
DIZZINESS: 0
CHILLS: 0
FOCAL WEAKNESS: 1
HEARTBURN: 0
HEADACHES: 0
MYALGIAS: 1
BLURRED VISION: 0
PALPITATIONS: 0
FEVER: 0

## 2018-01-19 ASSESSMENT — PAIN SCALES - GENERAL
PAINLEVEL_OUTOF10: 4
PAINLEVEL_OUTOF10: 1
PAINLEVEL_OUTOF10: 1
PAINLEVEL_OUTOF10: 2
PAINLEVEL_OUTOF10: 0
PAINLEVEL_OUTOF10: 2

## 2018-01-19 NOTE — PROGRESS NOTES
Pt re-educated on benefits of and safety risks of not having a bed alarm. Pt continues to refuse to have bed alarm on. Pt calls appropriately with any needs and to get OOB. Bed alarm off at this time.

## 2018-01-19 NOTE — OP REPORT
Neurosurgery Operative Report    Patient: Santos Layne    MRN: [id number]    Procedure date: 01/19/18    Procedure(s):  CERVICAL FUSION POSTERIOR C3-4 - Wound Class: Clean  CERVICAL LAMINECTOMY POSTERIOR - Wound Class: Clean    #1. C3-C4 laminectomy, medial facetectomy, and bilateral foraminotomy  #2. Segmental instrumentation C3-C4 using Synthes synapse system  #3. Posterolateral C3-C4 arthrodesis  #4. Autograft taken through same fascial incision supplemented with 0.5 mL of demineralized bone matrix  #5. 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    Assistant: PETR Hanson    Type of Anesthesia: General    Anesthesiologist: Dionicio Snider M.D.    Surgical Staff: Assistant: RADHA Griffiths  Circulator: Cheyenne Grewal R.N.  Relief Circulator: Christina Choudhury R.N.  Scrub Person: Mauricio Harley  Radiology Technologist: Ann Arriaza    Clinical Preamble:  Mr. Layne is a 47-year-old gentleman who presents with a 3-6 month history of progressive weakness in both his arms and legs, but mostly affecting his left arm and right leg. He was having repeated falls and was losing dexterity in his hands. Investigations eventually revealed cord compression at C3-C4 due to focal spondylosis at this level. I recommended a C3-C4 laminectomy to decompress the spinal cord, but also a fusion at C3-C4 destabilized the micro-instability which is present and contributing to the focal level of spondylosis.  The risks, benefits, and alternatives to surgery were discussed in detail with the patient preoperatively. The risks of the operation include, but are not limited to: risks associated with general anesthesia, bleeding, infection, CSF leak, incomplete resolution of the symptoms, need for future surgery, neurologic  deficit, and/or death. Informed consent was obtained.    Description of Surgical Procedure:  The patient was brought to the operating room and general anesthesia and endotracheal intubation were obtained. The patient was positioned prone in the [place] 3-point fixation and all bony prominences were well-padded. A Lowe catheter was inserted. Pneumatic compression devices were utilized. The patient was given 2 g of Ancef within 60 minutes of making skin incision. A proposed incision was marked on the skin in the midline from C2-C6. The patient was then prepared and draped in the usual sterile fashion using ChloraPrep. The proposed incision was infiltrated with 1% lidocaine with 1:200,000 epinephrine prior to making the skin incision. Baseline electrophysiologic monitoring was obtained preoperatively. There was a decrease in the baseline signals after the patient had been positioned prone. We ensured that this was not due to anesthetic related issues or due to mild placement of the electrodes. We decided that the best option was to proceed with surgery and decompress the spinal cord as soon as possible.    Skin incision was made with the scalpel and carried out through the subcutaneous tissues with monopolar cautery. Monopolar cautery was used to dissect the paraspinous muscles off the spinous processes and laminae from the inferior aspect of C2 to the superior aspect of C5. The facet joints were preserved, but at C3 and C4 the dissection was continued out laterally to expose the lateral masses of the facet joint C3-C4. Intraoperative x-ray was taken to confirm that we are exposing appropriate levels.    Once levels were confirmed using Misonix to create a laminotomy at the junction between the lamina and lateral masses of C3 and C4 bilaterally. Once these cuts were made and a laminectomy was completed by pulling up on the spinous process with the Kerrison and placing a microcurette under the lamina pulling it away  from the cord. There was improvement in the electrophysiologic monitoring after the laminectomy was completed. Hemostasis from the epidural veins was controlled with bipolar cautery. A 3 mm Kerr[place] rongeur was then used to undercut the lamina the contralateral side, thus performing a medial facetectomy and also foraminotomies so that we could see the C3 and C4 nerve roots exiting their foramina. Of note the lamina and lateral masses of C3 were subluxed forward on C4 quite significantly and there was a significant degree of spondylosis at the C3-C4 facet joint, consistent with the radiographic images showing spondylosis at that level.    Once we were satisfied with the neural decompression proceeded with the instrumentation. The lateral masses were decorticated with the Midas Diony high-speed drill, and the facet joint C3-C4 was drilled out.  holes were drilled in the lateral masses of C3 and C4 with the Midas Diony high-speed drill and then we proceeded to place the screws into the lateral masses of C3 and C4 bilaterally. In the end we used 3.5 x 14 mm screws at each point. There is excellent bony purchase. Two 30 millimeter titanium curved rods were then placed, and the subluxation was reduced by using the zhanna reducer and pulling C3 back up to the zhanna which was secured at C4. The set screws were placed at each level to maintain the reduction. The set screws were tightened down to the 's specification with the antitorque device.    The lamina of C3 and C4 had previously been denuded of all soft tissue and subsequently morcellized. At this point it was used for autograft. It was supplemented with 0.5 mL of demineralized bone matrix, and this mixture was placed over the lateral masses and into the facet joint C3 and C4 bilaterally. Once this was done final x-ray was taken showing the instrumentation to be in good position and we proceeded to irrigate the wound out thoroughly.    The wound was closed in  layers using interrupted 0 Vicryl sutures for the ligamentum nuchae and interrupted 2-0 Vicryl sutures for the subcutaneous tissues. Staples were used to close the skin. Patient tolerated the procedure well and there were no obvious intraoperative complications. There were changes in the Baseline electrophysiologic monitoring after the patient was positioned prone, but the signals improved after the laminectomy was performed. All sponge and needle counts were reported as correct at the end of the procedure. Estimated blood loss was 25 mL.    Estimated Blood Loss: 25 ml    Complications: None        Noah Trinidad M.D.

## 2018-01-19 NOTE — THERAPY
PT scheduled for neuro surgery today. Will evaluate tomorrow to address post op needs as appropriate. Thanks    Yeni Jordan, PT, DPT Pager: [phone]

## 2018-01-19 NOTE — CARE PLAN
Problem: Knowledge Deficit  Goal: Patient/Significant other demonstrates understanding of disease process, treatment plan, medications and discharge instructions  Outcome: PROGRESSING AS EXPECTED  Plan of care updated. Patient will be NPO at midnight for surgical procedure tomorrow. Patient verbalizes understanding.     Problem: Risk for Impaired Mobility  Goal: Early Mobilization    Intervention: Progressive mobilization-ADL Flow Sheet  Patient encouraged to mobilize throughout shift. Patient able to ambulate independently.

## 2018-01-19 NOTE — PROGRESS NOTES
Received report from night shift RN. Assumed care of patient. Pt assessed and stable. VSS. Patient reports 0/10 pain at this time. Discussed plan of care for day with patient and received verbal understanding. Call light within reach, strip alarm refused, bed in low position.

## 2018-01-19 NOTE — PROGRESS NOTES
Pt left with transport for surgery. Pt showered last night but did not get CHG bath this morning due to early arrival of transport.

## 2018-01-19 NOTE — CARE PLAN
Problem: Communication  Goal: The ability to communicate needs accurately and effectively will improve  Outcome: PROGRESSING AS EXPECTED  Pt calls for assistance appropriately. Call light in bed.     Problem: Venous Thromboembolism (VTW)/Deep Vein Thrombosis (DVT) Prevention:  Goal: Patient will participate in Venous Thrombosis (VTE)/Deep Vein Thrombosis (DVT)Prevention Measures  Outcome: PROGRESSING AS EXPECTED  Pt ambulating frequently in hallways.

## 2018-01-19 NOTE — PROGRESS NOTES
Received report from Merly RIDLEY. Pt has no complaints of pain. Pts wife at bedside, plan of care discussed. Two RN skin check complete.

## 2018-01-19 NOTE — PROGRESS NOTES
Neurosurgery Progress Note  Reason for Visit:  Cervical spondylotic myelopathy at C3-C4  Date of Surgery: 1/19/2018    Interval Events:  Patient continues to have left>right weakness in arms and legs    Exam:  /105   Pulse (!) 109   Temp 37.2 °C (98.9 °F)   Resp 16   Ht 1.829 m (6')   Wt 97.6 kg (215 lb 2.7 oz)   SpO2 93%   BMI 29.18 kg/m²     Deltoids 4+/5 on the right, 4-/5 on the left  Biceps 4+/5 on the right, 4-/5 on the left  Triceps 4+/5 on the right, 4+/5 on the left  Handgrips 4+/5 on the right, 4-/5 on the left  Legs 4+/5 right, 4-/5 left    Ins/Outs:  Intake/Output       01/18/18 0700 - 01/19/18 0659 01/19/18 0700 - 01/20/18 0659      [id number] 1900-0659 Total 0700-1859 1900-0659 Total       Intake    P.O.  --  525 525  --  -- --    P.O. -- 525 525 -- -- --    Total Intake -- 525 525 -- -- --       Output    Urine  --  -- --  --  -- --    Number of Times Voided -- -- -- 1 x -- 1 x    Stool  --  -- --  --  -- --    Number of Times Stooled 1 x -- 1 x 1 x -- 1 x    Total Output -- -- -- -- -- --       Net I/O     -- 525 525 -- -- --          Intake/Output Summary (Last 24 hours) at 01/19/18 1128  Last data filed at 01/19/18 0600   Gross per 24 hour   Intake              525 ml   Output                0 ml   Net              525 ml     Labs:  Recent Labs      01/17/18   0314  01/19/18   0314   WBC  9.4  15.3*   RBC  4.83  4.58*   HEMOGLOBIN  14.7  13.9*   HEMATOCRIT  43.2  41.0*   MCV  89.4  89.5   MCH  30.4  30.3   MCHC  34.0  33.9   RDW  41.8  41.1   PLATELETCT  352  362   MPV  11.6  11.4     Recent Labs      01/17/18   0314  01/19/18   0314   SODIUM  138  133*   POTASSIUM  3.6  4.0   CHLORIDE  104  99   CO2  24  25   GLUCOSE  90  122*   BUN  18  20   CREATININE  0.88  0.84   CALCIUM  9.5  9.9               Medications:  • dexamethasone  4 mg Q6HRS     • cyclobenzaprine  10 mg TID PRN     • gabapentin  300 mg TID     • gemfibrozil  600 mg BID     • hydrOXYzine HCl  50 mg TID PRN     •  lamotrigine  100 mg DAILY     • levothyroxine  50 mcg AM ES     • vitamin D  2,000 Units DAILY     • senna-docusate  2 Tab BID      And   • polyethylene glycol/lytes  1 Packet QDAY PRN      And   • magnesium hydroxide  30 mL QDAY PRN      And   • bisacodyl  10 mg QDAY PRN     • enoxaparin  40 mg DAILY     • ondansetron  4 mg Q4HRS PRN     • ondansetron  4 mg Q4HRS PRN     • promethazine  12.5-25 mg Q4HRS PRN     • promethazine  12.5-25 mg Q4HRS PRN     • prochlorperazine  5-10 mg Q4HRS PRN     • acetaminophen  650 mg Q6HRS PRN     • enalaprilat  1.25 mg Q6HRS PRN     • labetalol  10 mg Q6HRS PRN         Quality:  Date of Admission: 1/15/2018  Hospital day #1    Assessment and Plan:  Patient is ready for surgery later today      Noah Trinidad M.D.

## 2018-01-20 PROCEDURE — 770001 HCHG ROOM/CARE - MED/SURG/GYN PRIV*

## 2018-01-20 PROCEDURE — G8980 MOBILITY D/C STATUS: HCPCS | Mod: CI

## 2018-01-20 PROCEDURE — G8978 MOBILITY CURRENT STATUS: HCPCS | Mod: CI

## 2018-01-20 PROCEDURE — A9270 NON-COVERED ITEM OR SERVICE: HCPCS | Performed by: NEUROLOGICAL SURGERY

## 2018-01-20 PROCEDURE — 700102 HCHG RX REV CODE 250 W/ 637 OVERRIDE(OP): Performed by: NEUROLOGICAL SURGERY

## 2018-01-20 PROCEDURE — G8979 MOBILITY GOAL STATUS: HCPCS | Mod: CI

## 2018-01-20 PROCEDURE — A9270 NON-COVERED ITEM OR SERVICE: HCPCS | Performed by: FAMILY MEDICINE

## 2018-01-20 PROCEDURE — 97165 OT EVAL LOW COMPLEX 30 MIN: CPT

## 2018-01-20 PROCEDURE — 97161 PT EVAL LOW COMPLEX 20 MIN: CPT

## 2018-01-20 PROCEDURE — 700102 HCHG RX REV CODE 250 W/ 637 OVERRIDE(OP): Performed by: FAMILY MEDICINE

## 2018-01-20 PROCEDURE — G8987 SELF CARE CURRENT STATUS: HCPCS | Mod: CI

## 2018-01-20 PROCEDURE — 700111 HCHG RX REV CODE 636 W/ 250 OVERRIDE (IP): Performed by: INTERNAL MEDICINE

## 2018-01-20 PROCEDURE — 99232 SBSQ HOSP IP/OBS MODERATE 35: CPT | Performed by: INTERNAL MEDICINE

## 2018-01-20 PROCEDURE — 700112 HCHG RX REV CODE 229: Performed by: NEUROLOGICAL SURGERY

## 2018-01-20 PROCEDURE — G8989 SELF CARE D/C STATUS: HCPCS | Mod: CI

## 2018-01-20 PROCEDURE — 700105 HCHG RX REV CODE 258: Performed by: NEUROLOGICAL SURGERY

## 2018-01-20 PROCEDURE — G8988 SELF CARE GOAL STATUS: HCPCS | Mod: CI

## 2018-01-20 RX ADMIN — DEXAMETHASONE SODIUM PHOSPHATE 4 MG: 4 INJECTION, SOLUTION INTRAMUSCULAR; INTRAVENOUS at 12:43

## 2018-01-20 RX ADMIN — LEVOTHYROXINE SODIUM 50 MCG: 50 TABLET ORAL at 05:33

## 2018-01-20 RX ADMIN — MAGNESIUM HYDROXIDE 30 ML: 400 SUSPENSION ORAL at 18:31

## 2018-01-20 RX ADMIN — ACETAMINOPHEN 1000 MG: 500 TABLET, FILM COATED ORAL at 05:33

## 2018-01-20 RX ADMIN — OXYCODONE HYDROCHLORIDE 10 MG: 10 TABLET ORAL at 08:16

## 2018-01-20 RX ADMIN — GEMFIBROZIL 600 MG: 600 TABLET ORAL at 08:15

## 2018-01-20 RX ADMIN — POLYETHYLENE GLYCOL 3350 1 PACKET: 17 POWDER, FOR SOLUTION ORAL at 16:03

## 2018-01-20 RX ADMIN — OXYCODONE HYDROCHLORIDE 10 MG: 10 TABLET ORAL at 00:12

## 2018-01-20 RX ADMIN — SODIUM CHLORIDE: 9 INJECTION, SOLUTION INTRAVENOUS at 12:44

## 2018-01-20 RX ADMIN — LAMOTRIGINE 100 MG: 100 TABLET ORAL at 08:16

## 2018-01-20 RX ADMIN — ACETAMINOPHEN 1000 MG: 500 TABLET, FILM COATED ORAL at 00:12

## 2018-01-20 RX ADMIN — GEMFIBROZIL 600 MG: 600 TABLET ORAL at 19:35

## 2018-01-20 RX ADMIN — ACETAMINOPHEN 1000 MG: 500 TABLET, FILM COATED ORAL at 17:52

## 2018-01-20 RX ADMIN — OXYCODONE HYDROCHLORIDE 10 MG: 10 TABLET ORAL at 05:33

## 2018-01-20 RX ADMIN — DEXAMETHASONE SODIUM PHOSPHATE 4 MG: 4 INJECTION, SOLUTION INTRAMUSCULAR; INTRAVENOUS at 05:32

## 2018-01-20 RX ADMIN — STANDARDIZED SENNA CONCENTRATE AND DOCUSATE SODIUM 1 TABLET: 8.6; 5 TABLET, FILM COATED ORAL at 23:34

## 2018-01-20 RX ADMIN — OXYCODONE HYDROCHLORIDE 10 MG: 10 TABLET ORAL at 15:54

## 2018-01-20 RX ADMIN — DOCUSATE SODIUM 100 MG: 100 CAPSULE ORAL at 08:16

## 2018-01-20 RX ADMIN — DEXAMETHASONE SODIUM PHOSPHATE 4 MG: 4 INJECTION, SOLUTION INTRAMUSCULAR; INTRAVENOUS at 00:11

## 2018-01-20 RX ADMIN — GABAPENTIN 300 MG: 300 CAPSULE ORAL at 08:15

## 2018-01-20 RX ADMIN — OXYCODONE HYDROCHLORIDE 10 MG: 10 TABLET ORAL at 12:43

## 2018-01-20 RX ADMIN — GABAPENTIN 300 MG: 300 CAPSULE ORAL at 19:36

## 2018-01-20 RX ADMIN — ACETAMINOPHEN 1000 MG: 500 TABLET, FILM COATED ORAL at 12:14

## 2018-01-20 RX ADMIN — VITAMIN D, TAB 1000IU (100/BT) 2000 UNITS: 25 TAB at 08:15

## 2018-01-20 RX ADMIN — DEXAMETHASONE SODIUM PHOSPHATE 4 MG: 4 INJECTION, SOLUTION INTRAMUSCULAR; INTRAVENOUS at 23:15

## 2018-01-20 RX ADMIN — DEXAMETHASONE SODIUM PHOSPHATE 4 MG: 4 INJECTION, SOLUTION INTRAMUSCULAR; INTRAVENOUS at 17:51

## 2018-01-20 RX ADMIN — OXYCODONE HYDROCHLORIDE 10 MG: 10 TABLET ORAL at 19:36

## 2018-01-20 RX ADMIN — GABAPENTIN 300 MG: 300 CAPSULE ORAL at 15:54

## 2018-01-20 RX ADMIN — OXYCODONE HYDROCHLORIDE 5 MG: 10 TABLET ORAL at 23:15

## 2018-01-20 RX ADMIN — ACETAMINOPHEN 1000 MG: 500 TABLET, FILM COATED ORAL at 23:15

## 2018-01-20 ASSESSMENT — ACTIVITIES OF DAILY LIVING (ADL): TOILETING: INDEPENDENT

## 2018-01-20 ASSESSMENT — GAIT ASSESSMENTS
DEVIATION: INCREASED BASE OF SUPPORT
DISTANCE (FEET): 150
GAIT LEVEL OF ASSIST: SUPERVISED

## 2018-01-20 ASSESSMENT — ENCOUNTER SYMPTOMS
FEVER: 0
HEARTBURN: 0
FOCAL WEAKNESS: 1
MYALGIAS: 1
PALPITATIONS: 0
CHILLS: 0
DIZZINESS: 0
BLURRED VISION: 0
HEADACHES: 0
NAUSEA: 0

## 2018-01-20 ASSESSMENT — COGNITIVE AND FUNCTIONAL STATUS - GENERAL
MOBILITY SCORE: 23
CLIMB 3 TO 5 STEPS WITH RAILING: A LITTLE
DAILY ACTIVITIY SCORE: 22
SUGGESTED CMS G CODE MODIFIER MOBILITY: CI
SUGGESTED CMS G CODE MODIFIER DAILY ACTIVITY: CJ
DRESSING REGULAR UPPER BODY CLOTHING: A LITTLE
HELP NEEDED FOR BATHING: A LITTLE

## 2018-01-20 ASSESSMENT — PAIN SCALES - GENERAL
PAINLEVEL_OUTOF10: 0
PAINLEVEL_OUTOF10: 2
PAINLEVEL_OUTOF10: 5
PAINLEVEL_OUTOF10: 2
PAINLEVEL_OUTOF10: 4
PAINLEVEL_OUTOF10: 1

## 2018-01-20 ASSESSMENT — LIFESTYLE VARIABLES: DO YOU DRINK ALCOHOL: NO

## 2018-01-20 NOTE — THERAPY
"Occupational Therapy Evaluation completed.   Functional Status:  Pt s/p C3-4 posterior fusion, was able to demonstrate basic self care tasks with supervision/sba, L hand coordination during ADLs slightly impaired thus required extra time, spouse present for brace management training and able to demonstrate back adequately. Pt reports there's still some N/T of B hand and upto medial forearm; however symptoms has resolved significantly post op. Pt was trained on FM coordination exercises and able to demonstrate back. Pt and spouse has no further questions/concerns at this time in regards to basic self-care tasks. Pt does not present the need for acute skilled services at this time, might benefit from OP hand therapy if coordination does not improve over next few weeks and as deemed appropriate by surgeon.   Plan of Care: Patient with no further skilled OT needs in the acute care setting at this time  Discharge Recommendations:  Equipment: No Equipment Needed. Post-acute therapy Discharge to home with outpatient or home health for additional skilled therapy services.    See \"Rehab Therapy-Acute\" Patient Summary Report for complete documentation.    "

## 2018-01-20 NOTE — PROGRESS NOTES
Received report from night shift RN. Assumed care of patient. Pt assessed and stable. VSS. Patient reports 2/10 pain at this time.  Administered medication for pain.  Discussed plan of care for day with patient and received verbal understanding. Call light within reach, strip alarm active, bed in low position. SCDs refused. Lowe removed.

## 2018-01-20 NOTE — CARE PLAN
Problem: Safety  Goal: Will remain free from falls  Outcome: PROGRESSING AS EXPECTED  Educated pt to call before getting up bed alarm on and call light with in reach.    Problem: Venous Thromboembolism (VTW)/Deep Vein Thrombosis (DVT) Prevention:  Goal: Patient will participate in Venous Thrombosis (VTE)/Deep Vein Thrombosis (DVT)Prevention Measures  Outcome: PROGRESSING AS EXPECTED  Pt educated to wear while in bed.

## 2018-01-20 NOTE — PROGRESS NOTES
[facility] Hospitalist Progress Note    Date of Service: 2018    Chief Complaint  47 y.o. male admitted 1/15/2018 with b/l lower extremity weakness     Interval Problem Update  Pt seen and examined, no overnight events, neurosurgery following, having laminectomy today appreciate rec.     Consultants/Specialty  Neurology  Neurosurgery   Disposition  TBD         Review of Systems   Constitutional: Negative for chills and fever.   HENT: Negative for hearing loss.    Eyes: Negative for blurred vision.   Cardiovascular: Negative for chest pain and palpitations.   Gastrointestinal: Negative for heartburn and nausea.   Musculoskeletal: Positive for myalgias.   Skin: Negative for rash.   Neurological: Positive for focal weakness. Negative for dizziness and headaches.      Physical Exam  Laboratory/Imaging   Hemodynamics  Temp (24hrs), Av.5 °C (97.7 °F), Min:36.1 °C (96.9 °F), Max:37.2 °C (98.9 °F)   Temperature: 36.2 °C (97.1 °F)  Pulse  Av.9  Min: 60  Max: 119 Heart Rate (Monitored): 86  Blood Pressure: 114/84, NIBP: 109/73      Respiratory      Respiration: 16, Pulse Oximetry: 90 %             Fluids    Intake/Output Summary (Last 24 hours) at 18 1656  Last data filed at 18 1600   Gross per 24 hour   Intake             1765 ml   Output              280 ml   Net             1485 ml       Nutrition  Orders Placed This Encounter   Procedures   • DIET NPO     Standing Status:   Standing     Number of Occurrences:   8     Order Specific Question:   Restrict to:     Answer:   Strict [1]     Physical Exam   Constitutional: He is oriented to person, place, and time.   HENT:   Head: Normocephalic and atraumatic.   Eyes: Conjunctivae are normal. No scleral icterus.   Neck: Neck supple. No JVD present.   Cardiovascular: Normal heart sounds.  Exam reveals no gallop.    Pulmonary/Chest: Effort normal and breath sounds normal. He has no wheezes. He has no rales.   Abdominal: Soft. Bowel sounds are normal. He  exhibits no distension. There is no tenderness.   Musculoskeletal: He exhibits no edema.   Neurological: He is alert and oriented to person, place, and time.   MMT 5/5 except LUE 4/5   Skin: Skin is warm and dry. No rash noted. No erythema.       Recent Labs      01/17/18 0314  01/19/18   0314   WBC  9.4  15.3*   RBC  4.83  4.58*   HEMOGLOBIN  14.7  13.9*   HEMATOCRIT  43.2  41.0*   MCV  89.4  89.5   MCH  30.4  30.3   MCHC  34.0  33.9   RDW  41.8  41.1   PLATELETCT  352  362   MPV  11.6  11.4     Recent Labs      01/17/18 0314  01/19/18 0314   SODIUM  138  133*   POTASSIUM  3.6  4.0   CHLORIDE  104  99   CO2  24  25   GLUCOSE  90  122*   BUN  18  20   CREATININE  0.88  0.84   CALCIUM  9.5  9.9                      Assessment/Plan     * Weakness of extremity- (present on admission)   Assessment & Plan    MRI outpt showing some demyelination, neurology consulted, rec to MRI of C/T/L spine, appreciate rec.   MRI C spine was done today 1/17/18 showing focal cord compression at C3-C4,  Neurosurgery following, plan for surgery today.   Cont  on decadron           HTN (hypertension)- (present on admission)   Assessment & Plan    IV Vasotec and labetalol as needed for now  Check echocardiogram        Hypothyroid- (present on admission)   Assessment & Plan    Continue levothyroxine, check TSH        Bipolar disorder (CMS-HCC)- (present on admission)   Assessment & Plan    Continue Lamictal            Reviewed items::  Labs reviewed, Radiology images reviewed and Medications reviewed  Lowe catheter::  No Lowe  DVT prophylaxis pharmacological::  Heparin

## 2018-01-20 NOTE — CARE PLAN
Problem: Communication  Goal: The ability to communicate needs accurately and effectively will improve  Outcome: PROGRESSING AS EXPECTED  Call light within reach and pt communicates needs appropriately.     Problem: Infection  Goal: Will remain free from infection  Outcome: PROGRESSING AS EXPECTED  Pt and family educated on basic infection prevention techniques.

## 2018-01-20 NOTE — PROGRESS NOTES
[facility] Hospitalist Progress Note    Date of Service: 2018    Chief Complaint  47 y.o. male admitted 1/15/2018 with b/l lower extremity weakness     Interval Problem Update  Pt seen and examined, no overnight events, neurosurgery following, had laminectomy done on 18. No overnight events     Consultants/Specialty  Neurology  Neurosurgery   Disposition  TBD         Review of Systems   Constitutional: Negative for chills and fever.   HENT: Negative for hearing loss.    Eyes: Negative for blurred vision.   Cardiovascular: Negative for chest pain and palpitations.   Gastrointestinal: Negative for heartburn and nausea.   Musculoskeletal: Positive for myalgias.   Skin: Negative for rash.   Neurological: Positive for focal weakness. Negative for dizziness and headaches.      Physical Exam  Laboratory/Imaging   Hemodynamics  Temp (24hrs), Av.3 °C (97.4 °F), Min:36.2 °C (97.1 °F), Max:36.6 °C (97.8 °F)   Temperature: 36.3 °C (97.4 °F)  Pulse  Av.9  Min: 60  Max: 119 Heart Rate (Monitored): 86  Blood Pressure: 118/68, NIBP: 109/73      Respiratory      Respiration: 16, Pulse Oximetry: 92 %             Fluids    Intake/Output Summary (Last 24 hours) at 18 1038  Last data filed at 18 0809   Gross per 24 hour   Intake             1240 ml   Output             1755 ml   Net             -515 ml       Nutrition  Orders Placed This Encounter   Procedures   • DIET ORDER     Standing Status:   Standing     Number of Occurrences:   1     Order Specific Question:   Diet:     Answer:   Regular [1]     Physical Exam   Constitutional: He is oriented to person, place, and time.   HENT:   Head: Normocephalic and atraumatic.   Eyes: Conjunctivae are normal. No scleral icterus.   Neck: Neck supple. No JVD present.   Cardiovascular: Normal heart sounds.  Exam reveals no gallop.    Pulmonary/Chest: Effort normal and breath sounds normal. He has no wheezes. He has no rales.   Abdominal: Soft. Bowel sounds are normal.  He exhibits no distension. There is no tenderness.   Musculoskeletal: He exhibits no edema.   Neurological: He is alert and oriented to person, place, and time.   MMT 5/5 except LUE 4/5   Skin: Skin is warm and dry. No rash noted. No erythema.       Recent Labs      01/19/18   0314   WBC  15.3*   RBC  4.58*   HEMOGLOBIN  13.9*   HEMATOCRIT  41.0*   MCV  89.5   MCH  30.3   MCHC  33.9   RDW  41.1   PLATELETCT  362   MPV  11.4     Recent Labs      01/19/18   0314   SODIUM  133*   POTASSIUM  4.0   CHLORIDE  99   CO2  25   GLUCOSE  122*   BUN  20   CREATININE  0.84   CALCIUM  9.9                      Assessment/Plan     * Weakness of extremity- (present on admission)   Assessment & Plan    MRI outpt showing some demyelination, neurology consulted, rec to MRI of C/T/L spine, appreciate rec.   MRI C spine was done today 1/17/18 showing focal cord compression at C3-C4,  Neurosurgery following, had laminectomy done on 1/19/18  Cont  on decadron           HTN (hypertension)- (present on admission)   Assessment & Plan    IV Vasotec and labetalol as needed for now  Check echocardiogram        Hypothyroid- (present on admission)   Assessment & Plan    Continue levothyroxine, check TSH        Bipolar disorder (CMS-HCC)- (present on admission)   Assessment & Plan    Continue Lamictal            Reviewed items::  Labs reviewed, Radiology images reviewed and Medications reviewed  Lowe catheter::  No Lowe  DVT prophylaxis pharmacological::  Heparin

## 2018-01-20 NOTE — THERAPY
"48 y/o mlae adm for cervical cord compression with myelopathy S/P C3-4 posterior fusion, in an [place] collar.  Intact BLE strength and sensation. No RLE buckling nor=lenin( as per pt, this occured before sx). Intact balcne with level ground amb with no AD. No further acute PT services required at this time.    Physical Therapy Evaluation completed.   Bed Mobility:  Supine to Sit: Supervised  Transfers: Sit to Stand: Supervised  Gait: Level Of Assist: Supervised with No Equipment Needed       Plan of Care: Patient with no further skilled PT needs in the acute care setting at this time  Discharge Recommendations: Equipment: No Equipment Needed. Post-acute therapy Discharge to home with outpatient or home health for additional skilled therapy services.    See \"Rehab Therapy-Acute\" Patient Summary Report for complete documentation.     "

## 2018-01-20 NOTE — PROGRESS NOTES
Neurosurgery Progress Note    Subjective:  NSX  POD #1  Surgical site Pain controlled  Mobilizing  Strength in arms and legs much improved  Balance improved as well  Voiding    Exam:  Wound C/D/I  UE motor intact throughout right  -Left UE with trace weakness in shoulder abduction and tricepts, otherwise intact throughout  Sensation grossly intact  LE motor intact throughout bilaterally    BP  Min: 114/84  Max: 128/87  Pulse  Av.8  Min: 82  Max: 107  Resp  Av.9  Min: 14  Max: 33  Temp  Av.3 °C (97.3 °F)  Min: 36.2 °C (97.1 °F)  Max: 36.6 °C (97.8 °F)  SpO2  Av.6 %  Min: 90 %  Max: 96 %    No Data Recorded    Recent Labs      18   0314   WBC  15.3*   RBC  4.58*   HEMOGLOBIN  13.9*   HEMATOCRIT  41.0*   MCV  89.5   MCH  30.3   MCHC  33.9   RDW  41.1   PLATELETCT  362   MPV  11.4     Recent Labs      18   0314   SODIUM  133*   POTASSIUM  4.0   CHLORIDE  99   CO2  25   GLUCOSE  122*   BUN  20               Intake/Output       18 0700 - 18 0659 18 0700 - 18 0659      [id number] 0197-3639 Total 0854-8826 4825-3718 Total       Intake    P.O.  240  -- 240  --  -- --    P.O. 240 -- 240 -- -- --    I.V.  1000  -- 1000  --  -- --    Crystalloid Intake 1000 -- 1000 -- -- --    Total Intake 1240 -- 1240 -- -- --       Output    Urine  180  1200 1380  275  -- 275    Number of Times Voided 1 x -- 1 x -- -- --    Indwelling Cathether 100 1200 1300 275 -- 275    Void (ml) 80 -- 80 -- -- --    Stool  --  -- --  --  -- --    Number of Times Stooled 1 x -- 1 x 0 x -- 0 x    Blood  100  -- 100  --  -- --    Est. Blood Loss (mL) 100 -- 100 -- -- --    Total Output 280 1200 1480 275 -- 275       Net I/O     960 -1200 -240 -275 -- -275            Intake/Output Summary (Last 24 hours) at 18 1111  Last data filed at 18 0809   Gross per 24 hour   Intake             1240 ml   Output             1755 ml   Net             -515 ml            • NS   Continuous   • MD ALERT...Do not  administer NSAIDS or ASPIRIN unless ORDERED By Neurosurgery  1 Each PRN   • docusate sodium  100 mg BID   • senna-docusate  1 Tab Nightly   • senna-docusate  1 Tab Q24HRS PRN   • polyethylene glycol/lytes  1 Packet BID PRN   • magnesium hydroxide  30 mL QDAY PRN   • bisacodyl  10 mg Q24HRS PRN   • fleet  1 Each Once PRN   • acetaminophen  1,000 mg Q6HRS   • oxycodone immediate-release  5 mg Q3HRS PRN   • oxycodone immediate release  10 mg Q3HRS PRN   • hydromorphone  0.5 mg Q3HRS PRN   • ondansetron  4 mg Q4HRS PRN   • ondansetron  4 mg Q4HRS PRN   • promethazine  12.5-25 mg Q4HRS PRN   • promethazine  12.5-25 mg Q4HRS PRN   • prochlorperazine  5-10 mg Q4HRS PRN   • methocarbamol  750 mg Q8HRS PRN   • dexamethasone  4 mg Q6HRS   • cyclobenzaprine  10 mg TID PRN   • gabapentin  300 mg TID   • gemfibrozil  600 mg BID   • hydrOXYzine HCl  50 mg TID PRN   • lamotrigine  100 mg DAILY   • levothyroxine  50 mcg AM ES   • vitamin D  2,000 Units DAILY   • enoxaparin  40 mg DAILY   • promethazine  12.5-25 mg Q4HRS PRN   • promethazine  12.5-25 mg Q4HRS PRN   • enalaprilat  1.25 mg Q6HRS PRN   • labetalol  10 mg Q6HRS PRN       Assessment and Plan:    POD #1  Prophylactic anticoagulation: no    Plan:  1. Work with PT/OT  2. Stable exam  3. Ok for d/c from NSX standpoint

## 2018-01-21 VITALS
SYSTOLIC BLOOD PRESSURE: 157 MMHG | OXYGEN SATURATION: 94 % | TEMPERATURE: 97.4 F | DIASTOLIC BLOOD PRESSURE: 96 MMHG | HEIGHT: 72 IN | RESPIRATION RATE: 16 BRPM | HEART RATE: 90 BPM | BODY MASS INDEX: 29.14 KG/M2 | WEIGHT: 215.17 LBS

## 2018-01-21 LAB
ANION GAP SERPL CALC-SCNC: 8 MMOL/L (ref 0–11.9)
BUN SERPL-MCNC: 18 MG/DL (ref 8–22)
CALCIUM SERPL-MCNC: 9.2 MG/DL (ref 8.5–10.5)
CHLORIDE SERPL-SCNC: 100 MMOL/L (ref 96–112)
CO2 SERPL-SCNC: 25 MMOL/L (ref 20–33)
CREAT SERPL-MCNC: 0.69 MG/DL (ref 0.5–1.4)
ERYTHROCYTE [DISTWIDTH] IN BLOOD BY AUTOMATED COUNT: 43.1 FL (ref 35.9–50)
GLUCOSE SERPL-MCNC: 129 MG/DL (ref 65–99)
HCT VFR BLD AUTO: 38.8 % (ref 42–52)
HGB BLD-MCNC: 13.1 G/DL (ref 14–18)
MCH RBC QN AUTO: 31 PG (ref 27–33)
MCHC RBC AUTO-ENTMCNC: 33.8 G/DL (ref 33.7–35.3)
MCV RBC AUTO: 91.7 FL (ref 81.4–97.8)
PLATELET # BLD AUTO: 329 K/UL (ref 164–446)
PMV BLD AUTO: 11.5 FL (ref 9–12.9)
POTASSIUM SERPL-SCNC: 4.1 MMOL/L (ref 3.6–5.5)
RBC # BLD AUTO: 4.23 M/UL (ref 4.7–6.1)
SODIUM SERPL-SCNC: 133 MMOL/L (ref 135–145)
WBC # BLD AUTO: 15.4 K/UL (ref 4.8–10.8)

## 2018-01-21 PROCEDURE — 700102 HCHG RX REV CODE 250 W/ 637 OVERRIDE(OP): Performed by: NEUROLOGICAL SURGERY

## 2018-01-21 PROCEDURE — 85027 COMPLETE CBC AUTOMATED: CPT

## 2018-01-21 PROCEDURE — A9270 NON-COVERED ITEM OR SERVICE: HCPCS | Performed by: NEUROLOGICAL SURGERY

## 2018-01-21 PROCEDURE — 99239 HOSP IP/OBS DSCHRG MGMT >30: CPT | Performed by: INTERNAL MEDICINE

## 2018-01-21 PROCEDURE — A9270 NON-COVERED ITEM OR SERVICE: HCPCS | Performed by: FAMILY MEDICINE

## 2018-01-21 PROCEDURE — 700111 HCHG RX REV CODE 636 W/ 250 OVERRIDE (IP): Performed by: INTERNAL MEDICINE

## 2018-01-21 PROCEDURE — 80048 BASIC METABOLIC PNL TOTAL CA: CPT

## 2018-01-21 PROCEDURE — 36415 COLL VENOUS BLD VENIPUNCTURE: CPT

## 2018-01-21 PROCEDURE — 700102 HCHG RX REV CODE 250 W/ 637 OVERRIDE(OP): Performed by: FAMILY MEDICINE

## 2018-01-21 RX ORDER — DEXAMETHASONE 0.5 MG/1
0.5 TABLET ORAL 2 TIMES DAILY
Qty: 28 TAB | Refills: 0 | Status: ON HOLD | OUTPATIENT
Start: 2018-01-21 | End: 2018-11-04 | Stop reason: CLARIF

## 2018-01-21 RX ORDER — OXYCODONE HYDROCHLORIDE 5 MG/1
5 TABLET ORAL EVERY 4 HOURS PRN
Qty: 16 TAB | Refills: 0 | Status: SHIPPED | OUTPATIENT
Start: 2018-01-21 | End: 2018-01-24

## 2018-01-21 RX ADMIN — METHOCARBAMOL 750 MG: 750 TABLET ORAL at 07:54

## 2018-01-21 RX ADMIN — LAMOTRIGINE 100 MG: 100 TABLET ORAL at 07:54

## 2018-01-21 RX ADMIN — ACETAMINOPHEN 1000 MG: 500 TABLET, FILM COATED ORAL at 11:48

## 2018-01-21 RX ADMIN — VITAMIN D, TAB 1000IU (100/BT) 2000 UNITS: 25 TAB at 07:55

## 2018-01-21 RX ADMIN — POLYETHYLENE GLYCOL 3350 1 PACKET: 17 POWDER, FOR SOLUTION ORAL at 05:27

## 2018-01-21 RX ADMIN — ACETAMINOPHEN 1000 MG: 500 TABLET, FILM COATED ORAL at 05:19

## 2018-01-21 RX ADMIN — GABAPENTIN 300 MG: 300 CAPSULE ORAL at 07:54

## 2018-01-21 RX ADMIN — GEMFIBROZIL 600 MG: 600 TABLET ORAL at 07:54

## 2018-01-21 RX ADMIN — OXYCODONE HYDROCHLORIDE 5 MG: 10 TABLET ORAL at 08:59

## 2018-01-21 RX ADMIN — DEXAMETHASONE SODIUM PHOSPHATE 4 MG: 4 INJECTION, SOLUTION INTRAMUSCULAR; INTRAVENOUS at 05:18

## 2018-01-21 RX ADMIN — OXYCODONE HYDROCHLORIDE 10 MG: 10 TABLET ORAL at 11:48

## 2018-01-21 RX ADMIN — OXYCODONE HYDROCHLORIDE 5 MG: 10 TABLET ORAL at 05:20

## 2018-01-21 RX ADMIN — LEVOTHYROXINE SODIUM 50 MCG: 50 TABLET ORAL at 05:19

## 2018-01-21 ASSESSMENT — PAIN SCALES - GENERAL
PAINLEVEL_OUTOF10: 3
PAINLEVEL_OUTOF10: 2
PAINLEVEL_OUTOF10: 2

## 2018-01-21 NOTE — PROGRESS NOTES
Assumed care of patient at 1900 from dayshift Rn. Patient was sitting upright in bed with aspen collar in place and wife at bedside. Introduced self and explained POC for the evening. Patient and wife were agreeable to this plan and had no additional questions or concerns for staff at this time. Addressed any needs while in the room. All fall precautions in place and call light is within patient's reach.

## 2018-01-21 NOTE — CARE PLAN
Problem: Bowel/Gastric:  Goal: Will not experience complications related to bowel motility    Intervention: Implement interventions to promote bowel evacuation if inadequate bowel movements in past 48 hours   Implemented bowel protocol for patient to help promote Bm.      Problem: Pain Management  Goal: Pain level will decrease to patient's comfort goal    Intervention: Follow pain managment plan developed in collaboration with patient and Interdisciplinary Team  Will administer order pain meds as needed to help keep patient at desired comfort level throughout the night.

## 2018-01-21 NOTE — PROGRESS NOTES
Patient discharged to home at this time. Patient and wife educated on discharge instructions, home medications and follow up appointment. Patient discharged in a stable condition.

## 2018-01-21 NOTE — DISCHARGE INSTRUCTIONS
Discharge Instructions    Discharged to home by car with relative. Discharged via wheelchair, hospital escort: Yes.  Special equipment needed: C-Collar    Be sure to schedule a follow-up appointment with your primary care doctor or any specialists as instructed.     Discharge Plan:     Follow up with Neurosurgery.  Wear ASPEN collar while out of bed.  Call Doctor with any questions.    Diet Plan: Discussed  Activity Level: Discussed  Confirmed Follow up Appointment: Patient to Call and Schedule Appointment  Confirmed Symptoms Management: Discussed  Medication Reconciliation Updated: Yes  Influenza Vaccine Indication: Patient Refuses    I understand that a diet low in cholesterol, fat, and sodium is recommended for good health. Unless I have been given specific instructions below for another diet, I accept this instruction as my diet prescription.   Other diet: Regular    Special Instructions: None    · Is patient discharged on Warfarin / Coumadin?   No     Depression / Suicide Risk    As you are discharged from this [facility] facility, it is important to learn how to keep safe from harming yourself.    Recognize the warning signs:  · Abrupt changes in personality, positive or negative- including increase in energy   · Giving away possessions  · Change in eating patterns- significant weight changes-  positive or negative  · Change in sleeping patterns- unable to sleep or sleeping all the time   · Unwillingness or inability to communicate  · Depression  · Unusual sadness, discouragement and loneliness  · Talk of wanting to die  · Neglect of personal appearance   · Rebelliousness- reckless behavior  · Withdrawal from people/activities they love  · Confusion- inability to concentrate     If you or a loved one observes any of these behaviors or has concerns about self-harm, here's what you can do:  · Talk about it- your feelings and reasons for harming yourself  · Remove any means that you might use to hurt yourself  (examples: pills, rope, extension cords, firearm)  · Get professional help from the community (Mental Health, Substance Abuse, psychological counseling)  · Do not be alone:Call your Safe Contact- someone whom you trust who will be there for you.  · Call your local CRISIS HOTLINE 142-8755 or 909-013-8656  · Call your local Children's Mobile Crisis Response Team [place] (330) 141-6978 or www.RoomActually  · Call the toll free National Suicide Prevention Hotlines   · National Suicide Prevention Lifeline 474-282-HEUU (5828)  · National Hope Line Network 800-SUICIDE (777-2213)

## 2018-01-21 NOTE — DISCHARGE SUMMARY
CHIEF COMPLAINT ON ADMISSION  Chief Complaint   Patient presents with   • Unsteady Gait   • Fall       CODE STATUS  Full Code    HPI & HOSPITAL COURSE  This is a 47 y.o. male who was admitted on 1/15/18 after presenting to ER complaining of unsteady gait, numbness, tingling and weakness on all extremis. Pt was admitted for further work up, there was concern for MS initially. So neurology was consulted and rec MRI of C/T/L spine and MRI brain which were done and were neg for MS, but MRI of C spine showed focal cord compression at C3-C4. Neurosurgery was consulted and pt had cervical laminectomy done on 1/19/18. Pt tolerated procedure well. He is feeling better, was seen by physical therapy and and as been cleared for discharge home with outpt physical therapy.   Pt will be discharged home and will follow up with neurosurgery as outpt     DISCHARGE PROBLEM LIST  Principal Problem:    Weakness of extremity POA: Yes    HTN (hypertension) POA: Yes    Bipolar disorder (CMS-HCC) POA: Yes    Hypothyroid POA: Yes      FOLLOW UP  Future Appointments  Date Time Provider Department Center   4/23/2018 3:00 PM [facility] None     No follow-up provider specified.    MEDICATIONS ON DISCHARGE   Santos Layne   Home Medication Instructions SELINA:[id number]    Printed on:01/21/18 6349   Medication Information                      cyclobenzaprine (FLEXERIL) 10 MG Tab  Take 10 mg by mouth 2 Times a Day.             dexamethasone (DECADRON) 0.5 MG Tab  Take 1 Tab by mouth 2 times a day. Take 2 mg BID for 2 days then 1 mg BID for 2 days then 1 mg daily for 2 days then stop.             gabapentin (NEURONTIN) 300 MG Cap  Take 300 mg by mouth 3 times a day.             gemfibrozil (LOPID) 600 MG Tab  Take 600 mg by mouth 2 times a day.             hydrOXYzine HCl (ATARAX) 50 MG Tab  Take 50 mg by mouth 3 times a day.             levothyroxine (SYNTHROID) 50 MCG Tab  Take 50 mcg by mouth Every morning on an empty  stomach.             oxycodone immediate-release (ROXICODONE) 5 MG Tab  Take 1 Tab by mouth every four hours as needed (Moderate Pain (NRS Pain Scale 4-6; CPOT Pain Scale 3-5)) for up to 3 days.             vitamin D (CHOLECALCIFEROL) 1000 UNIT Tab  Take 2,000 Units by mouth every day.                 DIET  Orders Placed This Encounter   Procedures   • DIET ORDER     Standing Status:   Standing     Number of Occurrences:   1     Order Specific Question:   Diet:     Answer:   Regular [1]       ACTIVITY  As tolerated.      CONSULTATIONS  Neurosurgery: Dr. Trinidad    PROCEDURES  Cervical laminectomy     LABORATORY  Lab Results   Component Value Date/Time    SODIUM 133 (L) 01/21/2018 02:55 AM    POTASSIUM 4.1 01/21/2018 02:55 AM    CHLORIDE 100 01/21/2018 02:55 AM    CO2 25 01/21/2018 02:55 AM    GLUCOSE 129 (H) 01/21/2018 02:55 AM    BUN 18 01/21/2018 02:55 AM    CREATININE 0.69 01/21/2018 02:55 AM        Lab Results   Component Value Date/Time    WBC 15.4 (H) 01/21/2018 02:55 AM    HEMOGLOBIN 13.1 (L) 01/21/2018 02:55 AM    HEMATOCRIT 38.8 (L) 01/21/2018 02:55 AM    PLATELETCT 329 01/21/2018 02:55 AM        Total time of the discharge process exceeds 40 minutes

## 2018-01-21 NOTE — PROGRESS NOTES
Neurosurgery Progress Note    Subjective:  Pt awake, alert.   Improved  C/o hand n/t - overall improved  No radicular pain     Exam:  Wound C/D/I with staples, dressing removed   UE str 5/5 bilateral  Sens equal to light touch      BP  Min: 124/87  Max: 150/90  Pulse  Av.5  Min: 73  Max: 107  Resp  Av  Min: 16  Max: 16  Temp  Av.3 °C (97.4 °F)  Min: 35.6 °C (96.1 °F)  Max: 36.8 °C (98.2 °F)  SpO2  Av.5 %  Min: 92 %  Max: 93 %    No Data Recorded    Recent Labs      18   0255   WBC  15.3*  15.4*   RBC  4.58*  4.23*   HEMOGLOBIN  13.9*  13.1*   HEMATOCRIT  41.0*  38.8*   MCV  89.5  91.7   MCH  30.3  31.0   MCHC  33.9  33.8   RDW  41.1  43.1   PLATELETCT  362  329   MPV  11.4  11.5     Recent Labs      18   0255   SODIUM  133*  133*   POTASSIUM  4.0  4.1   CHLORIDE  99  100   CO2  25  25   GLUCOSE  122*  129*   BUN  20  18               Intake/Output       18 0700 - 18 0659 18 0700 - 18 0659      [id number] 7028-7837 Total 0370-7996 4566-7092 Total       Intake    Total Intake -- -- -- -- -- --       Output    Urine  275  -- 275  --  -- --    Number of Times Voided 1 x -- 1 x -- -- --    Indwelling Cathether 275 -- 275 -- -- --    Stool  --  -- --  --  -- --    Number of Times Stooled 0 x 1 x 1 x 1 x -- 1 x    Total Output 275 -- 275 -- -- --       Net I/O     -275 -- -275 -- -- --          No intake or output data in the 24 hours ending 18 0848         • NS   Continuous   • MD ALERT...Do not administer NSAIDS or ASPIRIN unless ORDERED By Neurosurgery  1 Each PRN   • docusate sodium  100 mg BID   • senna-docusate  1 Tab Nightly   • senna-docusate  1 Tab Q24HRS PRN   • polyethylene glycol/lytes  1 Packet BID PRN   • magnesium hydroxide  30 mL QDAY PRN   • bisacodyl  10 mg Q24HRS PRN   • fleet  1 Each Once PRN   • acetaminophen  1,000 mg Q6HRS   • oxycodone immediate-release  5 mg Q3HRS PRN   • oxycodone immediate release  10  mg Q3HRS PRN   • hydromorphone  0.5 mg Q3HRS PRN   • ondansetron  4 mg Q4HRS PRN   • ondansetron  4 mg Q4HRS PRN   • promethazine  12.5-25 mg Q4HRS PRN   • promethazine  12.5-25 mg Q4HRS PRN   • prochlorperazine  5-10 mg Q4HRS PRN   • methocarbamol  750 mg Q8HRS PRN   • dexamethasone  4 mg Q6HRS   • cyclobenzaprine  10 mg TID PRN   • gabapentin  300 mg TID   • gemfibrozil  600 mg BID   • hydrOXYzine HCl  50 mg TID PRN   • lamotrigine  100 mg DAILY   • levothyroxine  50 mcg AM ES   • vitamin D  2,000 Units DAILY   • enoxaparin  40 mg DAILY   • promethazine  12.5-25 mg Q4HRS PRN   • promethazine  12.5-25 mg Q4HRS PRN   • enalaprilat  1.25 mg Q6HRS PRN   • labetalol  10 mg Q6HRS PRN       Assessment and Plan:    POD #2 Posterior C3-4 lami/fusion   PT/OT  Stable exam  Will provide work note  IM providing dc meds   Ok for d/c from NSX standpoint

## 2018-03-01 ENCOUNTER — HOSPITAL ENCOUNTER (OUTPATIENT)
Dept: RADIOLOGY | Facility: MEDICAL CENTER | Age: 48
End: 2018-03-01
Attending: NURSE PRACTITIONER
Payer: MEDICAID

## 2018-03-01 DIAGNOSIS — M48.02 SPINAL STENOSIS IN CERVICAL REGION: ICD-10-CM

## 2018-03-01 PROCEDURE — 72040 X-RAY EXAM NECK SPINE 2-3 VW: CPT

## 2018-11-03 ENCOUNTER — HOSPITAL ENCOUNTER (INPATIENT)
Facility: MEDICAL CENTER | Age: 48
LOS: 8 days | DRG: 375 | End: 2018-11-12
Attending: EMERGENCY MEDICINE | Admitting: INTERNAL MEDICINE
Payer: MEDICAID

## 2018-11-03 ENCOUNTER — HOSPITAL ENCOUNTER (OUTPATIENT)
Dept: RADIOLOGY | Facility: MEDICAL CENTER | Age: 48
End: 2018-11-03

## 2018-11-03 DIAGNOSIS — C34.90 ADENOCARCINOMA OF LUNG, STAGE 4, UNSPECIFIED LATERALITY (HCC): ICD-10-CM

## 2018-11-03 DIAGNOSIS — K56.600 PARTIAL SMALL BOWEL OBSTRUCTION (HCC): ICD-10-CM

## 2018-11-03 DIAGNOSIS — R29.898 WEAKNESS OF EXTREMITY: ICD-10-CM

## 2018-11-03 DIAGNOSIS — R10.84 GENERALIZED ABDOMINAL PAIN: ICD-10-CM

## 2018-11-03 DIAGNOSIS — R19.00 ABDOMINAL MASS, UNSPECIFIED ABDOMINAL LOCATION: ICD-10-CM

## 2018-11-03 PROCEDURE — 82378 CARCINOEMBRYONIC ANTIGEN: CPT

## 2018-11-03 PROCEDURE — 94760 N-INVAS EAR/PLS OXIMETRY 1: CPT

## 2018-11-03 PROCEDURE — 99285 EMERGENCY DEPT VISIT HI MDM: CPT

## 2018-11-03 PROCEDURE — 84443 ASSAY THYROID STIM HORMONE: CPT

## 2018-11-03 PROCEDURE — 86301 IMMUNOASSAY TUMOR CA 19-9: CPT

## 2018-11-03 ASSESSMENT — PAIN SCALES - GENERAL: PAINLEVEL_OUTOF10: 0

## 2018-11-03 ASSESSMENT — PAIN DESCRIPTION - DESCRIPTORS: DESCRIPTORS: SHARP

## 2018-11-04 ENCOUNTER — APPOINTMENT (OUTPATIENT)
Dept: RADIOLOGY | Facility: MEDICAL CENTER | Age: 48
DRG: 375 | End: 2018-11-04
Attending: EMERGENCY MEDICINE
Payer: MEDICAID

## 2018-11-04 ENCOUNTER — APPOINTMENT (OUTPATIENT)
Dept: RADIOLOGY | Facility: MEDICAL CENTER | Age: 48
DRG: 375 | End: 2018-11-04
Attending: INTERNAL MEDICINE
Payer: MEDICAID

## 2018-11-04 PROBLEM — R19.00 ABDOMINAL MASS: Status: ACTIVE | Noted: 2018-11-04

## 2018-11-04 PROBLEM — E78.5 HLD (HYPERLIPIDEMIA): Status: ACTIVE | Noted: 2018-11-04

## 2018-11-04 PROBLEM — K56.600 PARTIAL SMALL BOWEL OBSTRUCTION (HCC): Status: ACTIVE | Noted: 2018-11-04

## 2018-11-04 LAB
CANCER AG19-9 SERPL-ACNC: 5.2 U/ML (ref 0–35)
CEA SERPL-MCNC: 3.5 NG/ML (ref 0–3)
TSH SERPL DL<=0.005 MIU/L-ACNC: 1.93 UIU/ML (ref 0.38–5.33)

## 2018-11-04 PROCEDURE — 700117 HCHG RX CONTRAST REV CODE 255: Performed by: INTERNAL MEDICINE

## 2018-11-04 PROCEDURE — 700102 HCHG RX REV CODE 250 W/ 637 OVERRIDE(OP): Performed by: INTERNAL MEDICINE

## 2018-11-04 PROCEDURE — 700105 HCHG RX REV CODE 258: Performed by: INTERNAL MEDICINE

## 2018-11-04 PROCEDURE — A9270 NON-COVERED ITEM OR SERVICE: HCPCS | Performed by: INTERNAL MEDICINE

## 2018-11-04 PROCEDURE — 99358 PROLONG SERVICE W/O CONTACT: CPT | Performed by: INTERNAL MEDICINE

## 2018-11-04 PROCEDURE — 99223 1ST HOSP IP/OBS HIGH 75: CPT | Performed by: INTERNAL MEDICINE

## 2018-11-04 PROCEDURE — A9270 NON-COVERED ITEM OR SERVICE: HCPCS | Performed by: EMERGENCY MEDICINE

## 2018-11-04 PROCEDURE — 770006 HCHG ROOM/CARE - MED/SURG/GYN SEMI*

## 2018-11-04 PROCEDURE — A9585 GADOBUTROL INJECTION: HCPCS | Performed by: INTERNAL MEDICINE

## 2018-11-04 PROCEDURE — 700102 HCHG RX REV CODE 250 W/ 637 OVERRIDE(OP): Performed by: EMERGENCY MEDICINE

## 2018-11-04 PROCEDURE — 70553 MRI BRAIN STEM W/O & W/DYE: CPT

## 2018-11-04 PROCEDURE — 70450 CT HEAD/BRAIN W/O DYE: CPT

## 2018-11-04 PROCEDURE — 71250 CT THORAX DX C-: CPT

## 2018-11-04 RX ORDER — MORPHINE SULFATE 4 MG/ML
4 INJECTION, SOLUTION INTRAMUSCULAR; INTRAVENOUS
Status: DISCONTINUED | OUTPATIENT
Start: 2018-11-04 | End: 2018-11-10

## 2018-11-04 RX ORDER — OXYCODONE HYDROCHLORIDE 5 MG/1
5 TABLET ORAL
Status: DISCONTINUED | OUTPATIENT
Start: 2018-11-04 | End: 2018-11-10

## 2018-11-04 RX ORDER — ONDANSETRON 2 MG/ML
4 INJECTION INTRAMUSCULAR; INTRAVENOUS EVERY 4 HOURS PRN
Status: DISCONTINUED | OUTPATIENT
Start: 2018-11-04 | End: 2018-11-12 | Stop reason: HOSPADM

## 2018-11-04 RX ORDER — GADOBUTROL 604.72 MG/ML
9 INJECTION INTRAVENOUS ONCE
Status: COMPLETED | OUTPATIENT
Start: 2018-11-04 | End: 2018-11-04

## 2018-11-04 RX ORDER — ONDANSETRON 4 MG/1
4 TABLET, ORALLY DISINTEGRATING ORAL EVERY 4 HOURS PRN
Status: DISCONTINUED | OUTPATIENT
Start: 2018-11-04 | End: 2018-11-12 | Stop reason: HOSPADM

## 2018-11-04 RX ORDER — GEMFIBROZIL 600 MG/1
600 TABLET, FILM COATED ORAL 2 TIMES DAILY
Status: DISCONTINUED | OUTPATIENT
Start: 2018-11-04 | End: 2018-11-12 | Stop reason: HOSPADM

## 2018-11-04 RX ORDER — HYDRALAZINE HYDROCHLORIDE 20 MG/ML
20 INJECTION INTRAMUSCULAR; INTRAVENOUS EVERY 6 HOURS PRN
Status: DISCONTINUED | OUTPATIENT
Start: 2018-11-04 | End: 2018-11-12 | Stop reason: HOSPADM

## 2018-11-04 RX ORDER — PROMETHAZINE HYDROCHLORIDE 25 MG/1
12.5-25 SUPPOSITORY RECTAL EVERY 4 HOURS PRN
Status: DISCONTINUED | OUTPATIENT
Start: 2018-11-04 | End: 2018-11-12 | Stop reason: HOSPADM

## 2018-11-04 RX ORDER — PROMETHAZINE HYDROCHLORIDE 25 MG/1
12.5-25 TABLET ORAL EVERY 4 HOURS PRN
Status: DISCONTINUED | OUTPATIENT
Start: 2018-11-04 | End: 2018-11-12 | Stop reason: HOSPADM

## 2018-11-04 RX ORDER — OXYCODONE HYDROCHLORIDE 10 MG/1
10 TABLET ORAL
Status: DISCONTINUED | OUTPATIENT
Start: 2018-11-04 | End: 2018-11-10

## 2018-11-04 RX ORDER — HYDROCODONE BITARTRATE AND ACETAMINOPHEN 5; 325 MG/1; MG/1
1 TABLET ORAL ONCE
Status: COMPLETED | OUTPATIENT
Start: 2018-11-04 | End: 2018-11-04

## 2018-11-04 RX ORDER — SODIUM CHLORIDE 9 MG/ML
INJECTION, SOLUTION INTRAVENOUS CONTINUOUS
Status: DISCONTINUED | OUTPATIENT
Start: 2018-11-04 | End: 2018-11-05

## 2018-11-04 RX ORDER — ACETAMINOPHEN 325 MG/1
650 TABLET ORAL EVERY 6 HOURS PRN
Status: DISCONTINUED | OUTPATIENT
Start: 2018-11-04 | End: 2018-11-12 | Stop reason: HOSPADM

## 2018-11-04 RX ORDER — LEVOTHYROXINE SODIUM 0.05 MG/1
50 TABLET ORAL
Status: DISCONTINUED | OUTPATIENT
Start: 2018-11-04 | End: 2018-11-12 | Stop reason: HOSPADM

## 2018-11-04 RX ADMIN — ACETAMINOPHEN 650 MG: 325 TABLET, FILM COATED ORAL at 13:38

## 2018-11-04 RX ADMIN — OXYCODONE HYDROCHLORIDE 10 MG: 10 TABLET ORAL at 23:07

## 2018-11-04 RX ADMIN — LEVOTHYROXINE SODIUM 50 MCG: 50 TABLET ORAL at 06:12

## 2018-11-04 RX ADMIN — HYDROCODONE BITARTRATE AND ACETAMINOPHEN 1 TABLET: 5; 325 TABLET ORAL at 04:05

## 2018-11-04 RX ADMIN — OXYCODONE HYDROCHLORIDE 10 MG: 10 TABLET ORAL at 06:21

## 2018-11-04 RX ADMIN — GEMFIBROZIL 600 MG: 600 TABLET ORAL at 09:33

## 2018-11-04 RX ADMIN — SODIUM CHLORIDE: 9 INJECTION, SOLUTION INTRAVENOUS at 23:07

## 2018-11-04 RX ADMIN — GEMFIBROZIL 600 MG: 600 TABLET ORAL at 18:21

## 2018-11-04 RX ADMIN — GADOBUTROL 9 ML: 604.72 INJECTION INTRAVENOUS at 21:43

## 2018-11-04 RX ADMIN — ACETAMINOPHEN 650 MG: 325 TABLET, FILM COATED ORAL at 21:50

## 2018-11-04 RX ADMIN — SODIUM CHLORIDE: 9 INJECTION, SOLUTION INTRAVENOUS at 05:32

## 2018-11-04 RX ADMIN — SODIUM CHLORIDE: 9 INJECTION, SOLUTION INTRAVENOUS at 13:38

## 2018-11-04 ASSESSMENT — PAIN SCALES - GENERAL
PAINLEVEL_OUTOF10: 3
PAINLEVEL_OUTOF10: 1
PAINLEVEL_OUTOF10: 5
PAINLEVEL_OUTOF10: 3
PAINLEVEL_OUTOF10: 7
PAINLEVEL_OUTOF10: 7

## 2018-11-04 ASSESSMENT — LIFESTYLE VARIABLES
PACK_YEARS: 20
PACK_YEARS: 20
EVER_SMOKED: YES
EVER_SMOKED: YES

## 2018-11-04 ASSESSMENT — COGNITIVE AND FUNCTIONAL STATUS - GENERAL
STANDING UP FROM CHAIR USING ARMS: A LITTLE
MOVING TO AND FROM BED TO CHAIR: A LITTLE
TURNING FROM BACK TO SIDE WHILE IN FLAT BAD: A LITTLE
MOVING FROM LYING ON BACK TO SITTING ON SIDE OF FLAT BED: A LITTLE
SUGGESTED CMS G CODE MODIFIER MOBILITY: CK
DAILY ACTIVITIY SCORE: 24
SUGGESTED CMS G CODE MODIFIER DAILY ACTIVITY: CH
WALKING IN HOSPITAL ROOM: A LITTLE
CLIMB 3 TO 5 STEPS WITH RAILING: A LOT
MOBILITY SCORE: 17

## 2018-11-04 ASSESSMENT — ENCOUNTER SYMPTOMS
FEVER: 0
MYALGIAS: 0
SEIZURES: 0
NECK PAIN: 0
BLOOD IN STOOL: 0
PALPITATIONS: 0
FLANK PAIN: 0
DIARRHEA: 0
BRUISES/BLEEDS EASILY: 0
HEADACHES: 0
CHILLS: 0
SORE THROAT: 0
BLURRED VISION: 0
WHEEZING: 0
SHORTNESS OF BREATH: 0
VOMITING: 0
FOCAL WEAKNESS: 0
DIZZINESS: 0
NAUSEA: 1
DIAPHORESIS: 0
COUGH: 0
SPUTUM PRODUCTION: 0
ABDOMINAL PAIN: 1
BACK PAIN: 0

## 2018-11-04 ASSESSMENT — COPD QUESTIONNAIRES
DURING THE PAST 4 WEEKS HOW MUCH DID YOU FEEL SHORT OF BREATH: NONE/LITTLE OF THE TIME
COPD SCREENING SCORE: 3
DO YOU EVER COUGH UP ANY MUCUS OR PHLEGM?: NO/ONLY WITH OCCASIONAL COLDS OR INFECTIONS
HAVE YOU SMOKED AT LEAST 100 CIGARETTES IN YOUR ENTIRE LIFE: YES

## 2018-11-04 ASSESSMENT — PATIENT HEALTH QUESTIONNAIRE - PHQ9
2. FEELING DOWN, DEPRESSED, IRRITABLE, OR HOPELESS: NOT AT ALL
1. LITTLE INTEREST OR PLEASURE IN DOING THINGS: NOT AT ALL
SUM OF ALL RESPONSES TO PHQ9 QUESTIONS 1 AND 2: 0

## 2018-11-04 NOTE — ED NOTES
Patient dessatting as low as 87% while asleep. Pt placed on 2L NC. MD in to update patient on chest CT results.

## 2018-11-04 NOTE — ED PROVIDER NOTES
ED Provider Note    Scribed for Sean Diane M.D. by Candido Ayala. 11/3/2018, 11:10 PM.    Primary care provider: CEDRIC Montemayor  Means of arrival: Ambulance  History obtained from: Patient  History limited by: None    CHIEF COMPLAINT  Chief Complaint   Patient presents with   • Abdominal Pain       HPI  Santos Layne is a 48 y.o. male who presents to the Emergency Department as a transfer from [facility] for evaluation of constant abdominal pain onset 2-3 weeks ago. The patient reports that he went to [place] for evaluation and was told that he had cancer in his abdomen. Per patient, he is experiencing associated confusion and some diarrhea. No exacerbating or alleviating factors reported at this time. The patient also confirms that he used to be a smoker, smoking 1-1.5 packs of cigarettes per day, however, for the last 3 years he has been vaping. He denies fever, chills, nausea, vomiting, bloody stool, and weight loss.    REVIEW OF SYSTEMS  Pertinent positives include abdominal pain, diarrhea, confusion. Pertinent negatives include no fever, chills, nausea, vomiting, bloody stool, weight loss. All other systems negative.    PAST MEDICAL HISTORY   has a past medical history of Arthritis; Back pain; Cataract; Disorder of thyroid; Fall; HTN (hypertension) (1/15/2018); and Psychiatric problem.    SURGICAL HISTORY   has a past surgical history that includes cervical fusion posterior (1/19/2018) and cervical laminectomy posterior (1/19/2018).    SOCIAL HISTORY  Social History   Substance Use Topics   • Smoking status: Former Smoker   • Smokeless tobacco: Former User     Quit date: 1/15/2016   • Alcohol use No      History   Drug Use No       FAMILY HISTORY  Family History   Problem Relation Age of Onset   • Diabetes Mother    • Heart Disease Mother    • Hypertension Mother    • Hyperlipidemia Mother    • Lung Disease Father    • Cancer Father    • Hypertension Father    •  "Hyperlipidemia Father    • Cancer Paternal Aunt        CURRENT MEDICATIONS  Home Medications    **Home medications have not yet been reviewed for this encounter**         ALLERGIES  Allergies   Allergen Reactions   • Pcn [Penicillins] Unspecified     Mother stated he had a reaction as a baby       PHYSICAL EXAM  VITAL SIGNS: /92   Pulse (!) 101   Temp 36.6 °C (97.8 °F)   Resp 18   Ht 1.778 m (5' 10\")   Wt 102.1 kg (225 lb)   SpO2 95%   BMI 32.28 kg/m²     Constitutional: Well developed, Well nourished, no acute distress.   HENT: Normocephalic, Atraumatic, Oropharynx moist.   Eyes: Conjunctiva normal, No discharge.   Cardiovascular: Normal heart rate, Normal rhythm, No murmurs, equal pulses.   Pulmonary: Normal breath sounds, No respiratory distress, No wheezing, No rales, No rhonchi.  Chest: No chest wall tenderness or deformity.   Abdomen:Soft, mild diffuse tenderness greatest in the periumbilical region no masses, no rebound, no guarding.   Back: No CVA tenderness.   Musculoskeletal: No major deformities noted, No tenderness.   Skin: Warm, Dry, No erythema, No rash.   Neurologic: Alert & oriented x 3, Normal motor function,  No focal deficits noted.   Psychiatric: Affect normal, Judgment normal, Mood normal.     RADIOLOGY  CT-HEAD W/O   Final Result         1.  There are nonspecific white matter changes, commonly associated with small vessel ischemic disease.  Associated mild cerebral atrophy is noted.   2.  Bilateral ventricular mild dilatation, could correspond with ex vacuo changes, consider component of hydrocephalus as clinically appropriate.      CT-CHEST (THORAX) W/O   Final Result         1.  Right upper lobe lobulated and spiculated pulmonary mass. Should be considered neoplastic unless proven otherwise.   2.  Left apical pulmonary nodule with spiculation, should be considered neoplastic unless proven otherwise.   3.  Abdominal masses along the peritoneum and omentum, appearance favors " abdominal carcinomatosis.            OUTSIDE IMAGES-DX CHEST   Final Result      OUTSIDE IMAGES-CT ABDOMEN /PELVIS   Final Result        The radiologist's interpretation of all radiological studies have been reviewed by me.    COURSE & MEDICAL DECISION MAKING  Pertinent Labs & Imaging studies reviewed. (See chart for details)    11:36 PM I obtained and reviewed the patient's previous medical records from [facility] showing that the patient had a WBC of 12.1, RBC of 5.72, Hemoglobin of 16.2, Hematocrit of 48.8, Platelet count of 334, Sodium of 135, Potassium of 4, Chloride of 99, CO2 of 25.1, Glucose of 100, BUN of 8,  AST/SGOT of 72, ALT/SGPT of 116, Calcium of 10.5, Alk Phos of 265, Lipase of 140. The UA was leukocyte esterase negative, nitrite negative, blood negative, bacteria rare, WBC rare, and RBC none seen. The patient's abdominal ultrasound showed unremarkable right upper quadrant. 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    11:41 PM - Patient seen and examined at bedside. Ordered CT-Chest, CT-head to evaluate his symptoms. The differential diagnoses include but are not limited to: Metastatic cancer, intra-abdominal mass, intra-thoracic mass, intra-cranial mass    12:25 AM discussed case with Dr. Matson who is agreed to admit the patient    3:55 AM discussed case with Dr. Malcolm general surgery he is agreed to consult on the patient.  Because of the partial small bowel obstruction.  He states he will consult on the patient    Medical Decision Making: This point time patient has multiple masses throughout the abdomen as well as the chest suggestive of possible metastatic disease.  Given the partial small bowel obstruction general surgery has been consulted but does not appear that patient has a complete obstruction at this point  time.  CT of the head was done because patient had increased confusion to rule out metastasis to the brain this appears to be negative at this point time.    DISPOSITION:  Patient will be admitted to Dr. Matson in guarded condition.     FINAL IMPRESSION  1. Partial small bowel obstruction (HCC)    2. Generalized abdominal pain    3. Abdominal mass, unspecified abdominal location          Candido GARCIA (Terranceibe), am scribing for, and in the presence of, Sean Diane M.D.    Electronically signed by: Candido Ayala (Scribe), 11/3/2018    Sean GARCIA M.D. personally performed the services described in this documentation, as scribed by Candido Ayala in my presence, and it is both accurate and complete. C.     The note accurately reflects work and decisions made by me.  Sean Diane  11/4/2018  3:57 AM

## 2018-11-04 NOTE — PROGRESS NOTES
AOx4  Pain declined  Pt on 2L nasal cannula- not baseline  Saturating well. Lungs clear in upper lobes, lower lobes diminished, deep breathing discussed  Bowels hypoactive x4. + gas. LBM: 11/4   Denies N/V. Diet: NPO  Some numbness and tingling reported in his right shoulder and both hands- baseline since spinal injury  PIV running IVF at 125 ml/hr. Dressing is CDI  Family at bedside and anxious  POC discussed  POC: IR biopsy    Bed in lowest and locked position. Call light within reach.         Pt educated that he is not allowed to leave to unit unless accompanied by an RN or a CNA- pt reluctantly verbalizes understanding

## 2018-11-04 NOTE — ASSESSMENT & PLAN NOTE
Concerning for metastatic disease  IR has been consulted for image guided biopsy  We will consider consulting oncology for further recommendations after path report  Pain control with oxycodone and IV morphine, monitor respiratory status closely  Monitor  CEA (+) but CA 19-9 (-)  Path report came back showing poorly differentiated carcinoma  most consistent with metastatic primary lung adenocarcinoma.  Oncology Dr. Monroe has been  consulted and will evaluate pt, appreciate rec.

## 2018-11-04 NOTE — H&P
[facility] Medicine History & Physical Note    Date of Service  11/4/2018    Primary Care Physician  CEDRIC Montemayor    Consultants  Surgery Dr. Malcolm    Code Status  Full code    Chief Complaint  Abdominal pain    History of Presenting Illness  48 y.o. male who presented 11/3/2018 with abdominal pain for the past 3 weeks.  Patient reports generalized abdominal pain with associated nausea but no vomiting.  Pain is rated at 7/10 intensity.  He denies any relieving or exacerbating factors.  He reports some loose stools today.  He denies any fevers or chills.  He presented to an outlying facility and was found to have abdominal mass on imaging concerning for cancer and was transferred to [facility] for further evaluation.  He reports decrease in appetite.  He denies any night sweats or weight loss.  He reports history of smoking but is currently vaping.  He reports a family history of leukemia in his father.  At this time he denies any chest pain, shortness of breath, cough, melena or hematemesis.    [facility] which are summarized as follows WBC of 12.1, RBC of 5.72, Hemoglobin of 16.2, Hematocrit of 48.8, Platelet count of 334, Sodium of 135, Potassium of 4, Chloride of 99, CO2 of 25.1, Glucose of 100, BUN of 8,  AST/SGOT of 72, ALT/SGPT of 116, Calcium of 10.5, Alk Phos of 265, Lipase of 140. The UA was leukocyte esterase negative, nitrite negative, blood negative, bacteria rare, WBC rare, and RBC none seen. The patient's abdominal ultrasound showed unremarkable right upper quadrant. 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    Review of Systems  Review of Systems   Constitutional: Positive for malaise/fatigue. Negative for chills, diaphoresis and fever.   HENT: Negative for hearing loss and sore  throat.    Eyes: Negative for blurred vision.   Respiratory: Negative for cough, sputum production, shortness of breath and wheezing.    Cardiovascular: Negative for chest pain, palpitations and leg swelling.   Gastrointestinal: Positive for abdominal pain and nausea. Negative for blood in stool, diarrhea, melena and vomiting.   Genitourinary: Negative for dysuria, flank pain and urgency.   Musculoskeletal: Negative for back pain, joint pain, myalgias and neck pain.   Skin: Negative for rash.   Neurological: Negative for dizziness, focal weakness, seizures and headaches.   Endo/Heme/Allergies: Does not bruise/bleed easily.   Psychiatric/Behavioral: Negative for suicidal ideas.   All other systems reviewed and are negative.      Past Medical History   has a past medical history of Arthritis; Back pain; Cataract; Disorder of thyroid; Fall; HTN (hypertension) (1/15/2018); and Psychiatric problem. He also has no past medical history of Anginal syndrome (CMS-HCC) ([facility]); Arrhythmia; Asthma; At risk for sleep apnea; Blood clotting disorder (CMS-HCC) (Formerly [facility]); Bronchitis; Cancer (CMS-HCC) (Former[facility]); Congestive heart failure (CMS-HCC) (Formerly [facility]); COPD (chronic obstructive pulmonary disease) (CMS-HCC) (Former[facility]); Diabetes (CMS-HCC) (Formerly [facility]); Dialysis patient (CMS-HCC) (Formerly [facility]); Glaucoma; Gynecological disorder; Heart murmur; Heart valve disease; Hemorrhagic disorder (CMS-HCC) ([facility]); Indigestion; Infectious disease; Jaundice; Myocardial infarct ([facility]); Pacemaker; Pneumonia; Renal disorder; Rheumatic fever; Seizure (CMS-HCC) (Formerly [facility]); Stroke (CMS-HCC) (Formerly [facility]); or Urinary incontinence.    Surgical History   has a past surgical history that includes cervical fusion posterior (1/19/2018) and cervical laminectomy posterior (1/19/2018).     Family History  family history includes Cancer in his father and paternal aunt; Diabetes in his mother; Heart Disease in his mother; Hyperlipidemia in his father and mother; Hypertension in his father and mother; Lung  Disease in his father.     Social History   reports that he has quit smoking. He quit smokeless tobacco use about 2 years ago. He reports that he does not drink alcohol or use drugs.    Allergies  Allergies   Allergen Reactions   • Pcn [Penicillins] Unspecified     Mother stated he had a reaction as a baby       Medications  Prior to Admission Medications   Prescriptions Last Dose Informant Patient Reported? Taking?   cyclobenzaprine (FLEXERIL) 10 MG Tab 1/15/2018 at 0900 Patient Yes No   Sig: Take 10 mg by mouth 2 Times a Day.   dexamethasone (DECADRON) 0.5 MG Tab   No No   Sig: Take 1 Tab by mouth 2 times a day. Take 2 mg BID for 2 days then 1 mg BID for 2 days then 1 mg daily for 2 days then stop.   gabapentin (NEURONTIN) 300 MG Cap begin on at 1/17/18 Patient Yes No   Sig: Take 300 mg by mouth 3 times a day.   gemfibrozil (LOPID) 600 MG Tab 1/15/2018 at 0900 Patient Yes No   Sig: Take 600 mg by mouth 2 times a day.   hydrOXYzine HCl (ATARAX) 50 MG Tab 1/15/2018 at 0900 Patient Yes No   Sig: Take 50 mg by mouth 3 times a day.   levothyroxine (SYNTHROID) 50 MCG Tab 1/15/2018 at 0800 Patient Yes No   Sig: Take 50 mcg by mouth Every morning on an empty stomach.   vitamin D (CHOLECALCIFEROL) 1000 UNIT Tab 1/15/2018 at 0900 Patient Yes No   Sig: Take 2,000 Units by mouth every day.      Facility-Administered Medications: None       Physical Exam  Temp:  [36.6 °C (97.8 °F)] 36.6 °C (97.8 °F)  Pulse:  [101] 101  Resp:  [11-20] 19  BP: (143)/(92) 143/92    Physical Exam   Constitutional: He is oriented to person, place, and time. He appears well-developed and well-nourished. No distress.   Obese   HENT:   Head: Normocephalic and atraumatic.   Mouth/Throat: Oropharynx is clear and moist.   Eyes: Pupils are equal, round, and reactive to light. Conjunctivae are normal. No scleral icterus.   Neck: Normal range of motion. Neck supple.   Cardiovascular: Normal rate, regular rhythm and normal heart sounds.    Pulmonary/Chest:  Effort normal and breath sounds normal. No respiratory distress. He has no wheezes. He has no rales.   Abdominal: Bowel sounds are normal. He exhibits distension. There is tenderness (Mild tenderness diffusely). There is no rebound.   Musculoskeletal: Normal range of motion. He exhibits no edema or tenderness.   Lymphadenopathy:     He has no cervical adenopathy.   Neurological: He is alert and oriented to person, place, and time. No cranial nerve deficit. Coordination normal.   Skin: Skin is warm. No rash noted.   Psychiatric: He has a normal mood and affect. His behavior is normal.   Nursing note and vitals reviewed.      Laboratory:          No results for input(s): ALTSGPT, ASTSGOT, ALKPHOSPHAT, TBILIRUBIN, DBILIRUBIN, GAMMAGT, AMYLASE, LIPASE, ALB, PREALBUMIN, GLUCOSE in the last 72 hours.              No results for input(s): TROPONINI in the last 72 hours.    Urinalysis:    No results found     Imaging:  CT-HEAD W/O   Final Result         1.  There are nonspecific white matter changes, commonly associated with small vessel ischemic disease.  Associated mild cerebral atrophy is noted.   2.  Bilateral ventricular mild dilatation, could correspond with ex vacuo changes, consider component of hydrocephalus as clinically appropriate.      CT-CHEST (THORAX) W/O   Final Result         1.  Right upper lobe lobulated and spiculated pulmonary mass. Should be considered neoplastic unless proven otherwise.   2.  Left apical pulmonary nodule with spiculation, should be considered neoplastic unless proven otherwise.   3.  Abdominal masses along the peritoneum and omentum, appearance favors abdominal carcinomatosis.            OUTSIDE IMAGES-DX CHEST   Final Result      OUTSIDE IMAGES-CT ABDOMEN /PELVIS   Final Result      IR-CONSULT AND TREAT    (Results Pending)         Assessment/Plan:  I anticipate this patient will require at least two midnights for appropriate medical management, necessitating inpatient  admission.    Partial small bowel obstruction (HCC)- (present on admission)   Assessment & Plan    N.p.o.  IV fluid hydration with lactated Ringer's  Surgery has been consulted     Abdominal mass- (present on admission)   Assessment & Plan    Concerning for metastatic disease  IR has been consulted for image guided biopsy  We will consider consulting oncology for further recommendations  Pain control with oxycodone and IV morphine, monitor respiratory status closely     HTN (hypertension)- (present on admission)   Assessment & Plan    IV hydralazine as needed for SBP greater than 160     HLD (hyperlipidemia)   Assessment & Plan    Continue gemfibrozil     Hypothyroid- (present on admission)   Assessment & Plan    Check TSH  Continue Synthroid         VTE prophylaxis: SCD    I spent a total of 30 minutes of non face to face time performing additional research, reviewing medical records from transferring facility, discussing plan of care with other healthcare providers. Start time: 4 30 am. End time: 5 00 am

## 2018-11-04 NOTE — CONSULTS
General Surgery Consult    CHIEF COMPLAINT: Abdominal pain.     HISTORY OF PRESENT ILLNESS: The patient is a 48 y.o. male, who presents with abdominal pain.  He presented to the emergency room in [place] where he underwent a workup that was concerning for lung cancer with carcinomatosis of the abdomen.  His CT scan also suggested a partial small bowel obstruction.  He was transferred to [facility] for a higher level of care.  General surgery was consulted for potential bowel obstruction secondary to his carcinomatosis.  The patient reports having multiple bowel movements, he has been tolerating a diet without nausea or vomiting.  The patient has had bowel movements since his arrival.  His abdominal pain has subsided significantly.  He does have a history of smoking.  He currently vapes.  He has a strong family history of cancer on his father's side.    PAST MEDICAL HISTORY:  has a past medical history of Arthritis; Back pain; Cataract; Disorder of thyroid; Fall; HTN (hypertension) (1/15/2018); and Psychiatric problem.     PAST SURGICAL HISTORY:  has a past surgical history that includes cervical fusion posterior (1/19/2018) and cervical laminectomy posterior (1/19/2018).     ALLERGIES:   Allergies   Allergen Reactions   • Pcn [Penicillins] Unspecified     Mother stated he had a reaction as a baby        CURRENT MEDICATIONS:   Home Medications    **Home medications have not yet been reviewed for this encounter**         FAMILY HISTORY:   Family History   Problem Relation Age of Onset   • Diabetes Mother    • Heart Disease Mother    • Hypertension Mother    • Hyperlipidemia Mother    • Lung Disease Father    • Cancer Father    • Hypertension Father    • Hyperlipidemia Father    • Cancer Paternal Aunt         SOCIAL HISTORY:   Social History     Social History Main Topics   • Smoking status: Former Smoker   • Smokeless tobacco: Former User     Quit date: 1/15/2016   • Alcohol use No   • Drug use:  "No   • Sexual activity: Not on file       REVIEW OF SYSTEMS: Comprehensive review of systems was negative aside from the above HPI.     PHYSICAL EXAMINATION:     GENERAL: The patient is in no acute distress.   VITAL SIGNS: Blood pressure 110/70, pulse 82, temperature 36.8 °C (98.3 °F), resp. rate 19, height 1.778 m (5' 10\"), weight 102.1 kg (225 lb), SpO2 98 %.  HEAD AND NECK: Demonstrates symmetric, reactive pupils. Extraocular muscles   are intact. Nares and oropharynx are clear.   NECK: Supple. No adenopathy.  CHEST:No respiratory distress.    CARDIOVASCULAR: Regular rate. The extremities are well perfused.   ABDOMEN: Soft, nontender to deep palpation.  No guarding or rebound.   EXTREMITIES: Examination of the upper and lower extremities demonstrates no cyanosis edema or clubbing.  NEUROLOGIC: Alert & oriented x 3, Normal motor function, Normal sensory function, No focal deficits noted.    LABORATORY VALUES:                      IMAGING:   CT-HEAD W/O   Final Result         1.  There are nonspecific white matter changes, commonly associated with small vessel ischemic disease.  Associated mild cerebral atrophy is noted.   2.  Bilateral ventricular mild dilatation, could correspond with ex vacuo changes, consider component of hydrocephalus as clinically appropriate.      CT-CHEST (THORAX) W/O   Final Result         1.  Right upper lobe lobulated and spiculated pulmonary mass. Should be considered neoplastic unless proven otherwise.   2.  Left apical pulmonary nodule with spiculation, should be considered neoplastic unless proven otherwise.   3.  Abdominal masses along the peritoneum and omentum, appearance favors abdominal carcinomatosis.            OUTSIDE IMAGES-DX CHEST   Final Result      OUTSIDE IMAGES-CT ABDOMEN /PELVIS   Final Result      IR-CONSULT AND TREAT    (Results Pending)       IMPRESSION AND PLAN:     1.  Abdominal pain.  2.  Likely metastatic disease with carcinomatosis    1.  The patient exhibits " no signs or symptoms of bowel obstruction at this time.  His abdomen is soft.  He is having bowel function.  He denies nausea or vomiting.  If there is any concern for a mechanical obstruction I would recommend a small bowel series with Gastrografin.  I do not believe that is necessary at this time.  2.  Recommend oncology consult for aid in workup of likely metastatic disease.  3.  If pathology returns positive recommend palliative care consult.  4.  Surgery signing off.  Please reconsult as necessary.      ___________________________________   Roland Malcolm M.D.    DD: 11/4/2018 DT: 11:22 AM

## 2018-11-04 NOTE — ASSESSMENT & PLAN NOTE
Per surgery consultation, no small obstruction noted.  Recommend IR guided peritoneal biopsy for diagnosis of abdominal mass.  Had biopsy done on 11/6 , path report now showing adenocarcinoma of the lung as primary   Patient currently does have normal bowel function.  Oncology consulted

## 2018-11-04 NOTE — ED TRIAGE NOTES
[facility] and diarrhea x2-3 weeks. CT at outside facility showing ?SBO and new right iliac and abdominal masses. Patient arrives with imaging. Received 5mg morphine and phenergan en route. Currently pain free.

## 2018-11-04 NOTE — PROGRESS NOTES
Unfortunate 48-year-old former smoker transferred from [place] with abdominal pain and partial small bowel obstruction due to carcinomatosis of the abdomen in addition found to have 2 large lung masses.  Dr. Malcolm has been consulted from general surgery. CA-19-9, CEA ordered.  Medical oncology recommending IR peritoneal Bx. MRI brain w/ contrast, Patient and family updated on careplan.

## 2018-11-04 NOTE — PROGRESS NOTES
Two RN skin check completed:    Bilateral ears red, but blanching- ear foam ordered    Some red spots on pt's back- acne.     No other notable areas. All bony prominences intact

## 2018-11-05 ENCOUNTER — APPOINTMENT (OUTPATIENT)
Dept: RADIOLOGY | Facility: MEDICAL CENTER | Age: 48
DRG: 375 | End: 2018-11-05
Attending: INTERNAL MEDICINE
Payer: MEDICAID

## 2018-11-05 PROBLEM — E66.9 CLASS 1 OBESITY IN ADULT: Status: ACTIVE | Noted: 2018-11-05

## 2018-11-05 LAB
ANION GAP SERPL CALC-SCNC: 9 MMOL/L (ref 0–11.9)
BASOPHILS # BLD AUTO: 0.7 % (ref 0–1.8)
BASOPHILS # BLD: 0.04 K/UL (ref 0–0.12)
BUN SERPL-MCNC: 6 MG/DL (ref 8–22)
CALCIUM SERPL-MCNC: 9.7 MG/DL (ref 8.5–10.5)
CHLORIDE SERPL-SCNC: 104 MMOL/L (ref 96–112)
CO2 SERPL-SCNC: 24 MMOL/L (ref 20–33)
CREAT SERPL-MCNC: 0.79 MG/DL (ref 0.5–1.4)
EOSINOPHIL # BLD AUTO: 0.24 K/UL (ref 0–0.51)
EOSINOPHIL NFR BLD: 4.2 % (ref 0–6.9)
ERYTHROCYTE [DISTWIDTH] IN BLOOD BY AUTOMATED COUNT: 41.4 FL (ref 35.9–50)
GLUCOSE SERPL-MCNC: 93 MG/DL (ref 65–99)
HCT VFR BLD AUTO: 38.2 % (ref 42–52)
HGB BLD-MCNC: 12.3 G/DL (ref 14–18)
IMM GRANULOCYTES # BLD AUTO: 0.02 K/UL (ref 0–0.11)
IMM GRANULOCYTES NFR BLD AUTO: 0.3 % (ref 0–0.9)
INR PPP: 1.07 (ref 0.87–1.13)
LYMPHOCYTES # BLD AUTO: 1.72 K/UL (ref 1–4.8)
LYMPHOCYTES NFR BLD: 29.8 % (ref 22–41)
MCH RBC QN AUTO: 27.8 PG (ref 27–33)
MCHC RBC AUTO-ENTMCNC: 32.2 G/DL (ref 33.7–35.3)
MCV RBC AUTO: 86.2 FL (ref 81.4–97.8)
MONOCYTES # BLD AUTO: 0.62 K/UL (ref 0–0.85)
MONOCYTES NFR BLD AUTO: 10.7 % (ref 0–13.4)
NEUTROPHILS # BLD AUTO: 3.14 K/UL (ref 1.82–7.42)
NEUTROPHILS NFR BLD: 54.3 % (ref 44–72)
NRBC # BLD AUTO: 0 K/UL
NRBC BLD-RTO: 0 /100 WBC
PLATELET # BLD AUTO: 254 K/UL (ref 164–446)
PMV BLD AUTO: 10.6 FL (ref 9–12.9)
POTASSIUM SERPL-SCNC: 3.8 MMOL/L (ref 3.6–5.5)
PROTHROMBIN TIME: 14 SEC (ref 12–14.6)
RBC # BLD AUTO: 4.43 M/UL (ref 4.7–6.1)
SODIUM SERPL-SCNC: 137 MMOL/L (ref 135–145)
WBC # BLD AUTO: 5.8 K/UL (ref 4.8–10.8)

## 2018-11-05 PROCEDURE — 36415 COLL VENOUS BLD VENIPUNCTURE: CPT

## 2018-11-05 PROCEDURE — 770006 HCHG ROOM/CARE - MED/SURG/GYN SEMI*

## 2018-11-05 PROCEDURE — 99233 SBSQ HOSP IP/OBS HIGH 50: CPT | Performed by: INTERNAL MEDICINE

## 2018-11-05 PROCEDURE — 700102 HCHG RX REV CODE 250 W/ 637 OVERRIDE(OP): Performed by: INTERNAL MEDICINE

## 2018-11-05 PROCEDURE — 85025 COMPLETE CBC W/AUTO DIFF WBC: CPT

## 2018-11-05 PROCEDURE — 85610 PROTHROMBIN TIME: CPT

## 2018-11-05 PROCEDURE — A9270 NON-COVERED ITEM OR SERVICE: HCPCS | Performed by: INTERNAL MEDICINE

## 2018-11-05 PROCEDURE — 700111 HCHG RX REV CODE 636 W/ 250 OVERRIDE (IP): Performed by: INTERNAL MEDICINE

## 2018-11-05 PROCEDURE — 700105 HCHG RX REV CODE 258: Performed by: INTERNAL MEDICINE

## 2018-11-05 PROCEDURE — 700101 HCHG RX REV CODE 250: Performed by: INTERNAL MEDICINE

## 2018-11-05 PROCEDURE — 80048 BASIC METABOLIC PNL TOTAL CA: CPT

## 2018-11-05 RX ORDER — DEXTROSE MONOHYDRATE, SODIUM CHLORIDE, AND POTASSIUM CHLORIDE 50; 1.49; 4.5 G/1000ML; G/1000ML; G/1000ML
INJECTION, SOLUTION INTRAVENOUS CONTINUOUS
Status: DISCONTINUED | OUTPATIENT
Start: 2018-11-05 | End: 2018-11-05

## 2018-11-05 RX ADMIN — OXYCODONE HYDROCHLORIDE 10 MG: 10 TABLET ORAL at 19:18

## 2018-11-05 RX ADMIN — POTASSIUM CHLORIDE, DEXTROSE MONOHYDRATE AND SODIUM CHLORIDE: 150; 5; 450 INJECTION, SOLUTION INTRAVENOUS at 08:30

## 2018-11-05 RX ADMIN — GEMFIBROZIL 600 MG: 600 TABLET ORAL at 17:14

## 2018-11-05 RX ADMIN — OXYCODONE HYDROCHLORIDE 5 MG: 5 TABLET ORAL at 11:19

## 2018-11-05 RX ADMIN — MORPHINE SULFATE 4 MG: 4 INJECTION INTRAVENOUS at 07:40

## 2018-11-05 RX ADMIN — OXYCODONE HYDROCHLORIDE 10 MG: 10 TABLET ORAL at 16:08

## 2018-11-05 RX ADMIN — MORPHINE SULFATE 4 MG: 4 INJECTION INTRAVENOUS at 12:11

## 2018-11-05 RX ADMIN — OXYCODONE HYDROCHLORIDE 10 MG: 10 TABLET ORAL at 22:20

## 2018-11-05 RX ADMIN — SODIUM CHLORIDE: 9 INJECTION, SOLUTION INTRAVENOUS at 07:37

## 2018-11-05 ASSESSMENT — ENCOUNTER SYMPTOMS
NAUSEA: 0
VOMITING: 0
SPEECH CHANGE: 0
DIARRHEA: 0
BACK PAIN: 0
DIZZINESS: 0
WEIGHT LOSS: 0
DEPRESSION: 0
SPUTUM PRODUCTION: 0
CHILLS: 0
CONSTIPATION: 0
NECK PAIN: 0
WHEEZING: 0
PND: 0
FALLS: 0
SHORTNESS OF BREATH: 0
PALPITATIONS: 0
FEVER: 0
HEADACHES: 0
COUGH: 0
EYE PAIN: 0
HEARTBURN: 0
SEIZURES: 0
ABDOMINAL PAIN: 1
TREMORS: 0
BLURRED VISION: 0
WEAKNESS: 0

## 2018-11-05 ASSESSMENT — PAIN SCALES - GENERAL
PAINLEVEL_OUTOF10: 3
PAINLEVEL_OUTOF10: 7
PAINLEVEL_OUTOF10: 4
PAINLEVEL_OUTOF10: 7
PAINLEVEL_OUTOF10: 2
PAINLEVEL_OUTOF10: 7
PAINLEVEL_OUTOF10: 5
PAINLEVEL_OUTOF10: 2

## 2018-11-05 ASSESSMENT — LIFESTYLE VARIABLES: SUBSTANCE_ABUSE: 0

## 2018-11-05 NOTE — PROGRESS NOTES
Assumed care of patient who is AOx4. Pt has 3 out of 10 pain level, no nausea, or dizziness. Pt declines interventions for pain level at this time. Pt is tolerating clear liquid diet, but will be NPO at midnight. Pt is voiding in the urinal. Pt mobility status is independent. Pt went to MRI of the head as well.   RN Reviewed plan of care with patient, bed in lowest position and locked, pt resting comfortably now, call light within reach, all needs met at this time

## 2018-11-05 NOTE — PROGRESS NOTES
[facility] Hospitalist Progress Note    Date of Service: 2018    Chief Complaint  48 y.o. male admitted 11/3/2018 with complaint of abdominal pain.  Patient was noticed to have abdominal mass and partial small bowel obstruction initially.  Surgery was consulted and recommend IR guided biopsy to characterize abdominal mass.  Also recommend oncology follow-up if diagnosed with cancer.  Patient currently does not have small bowel obstruction.  Patient can tolerate diet.    Interval Problem Update  .  Patient wants to eat.  Discontinue IV fluid because IR cannot perform biopsy today.  Will await for tomorrow. Patient's pain is local, 3-4/10, intermittent and does not radiate to other location, sharp and with some tingling. Can be controlled by pain meds.  Continue symptom management.  Other options including outpatient biopsy by IR.      Consultants/Specialty  Sx  IR    Disposition  TBD        Review of Systems   Constitutional: Negative for chills, fever and weight loss.   HENT: Negative for congestion, ear discharge, ear pain, hearing loss and nosebleeds.    Eyes: Negative for blurred vision and pain.   Respiratory: Negative for cough, sputum production, shortness of breath and wheezing.    Cardiovascular: Negative for chest pain, palpitations, leg swelling and PND.   Gastrointestinal: Positive for abdominal pain. Negative for constipation, diarrhea, heartburn, nausea and vomiting.   Genitourinary: Negative for dysuria, frequency and hematuria.   Musculoskeletal: Negative for back pain, falls, joint pain and neck pain.   Skin: Negative for rash.   Neurological: Negative for dizziness, tremors, speech change, seizures, weakness and headaches.   Psychiatric/Behavioral: Negative for depression, substance abuse and suicidal ideas.      Physical Exam  Laboratory/Imaging   Hemodynamics  Temp (24hrs), Av.9 °C (98.4 °F), Min:36.4 °C (97.6 °F), Max:37.2 °C (99 °F)   Temperature: 36.7 °C (98.1 °F)  Pulse  Av.8   Min: 80  Max: 101    Blood Pressure: 135/89      Respiratory      Respiration: 20, Pulse Oximetry: 98 %        RUL Breath Sounds: Clear, RML Breath Sounds: Clear, RLL Breath Sounds: Diminished, THERESA Breath Sounds: Diminished, LLL Breath Sounds: Diminished    Fluids    Intake/Output Summary (Last 24 hours) at 11/05/18 1253  Last data filed at 11/05/18 0400   Gross per 24 hour   Intake             1620 ml   Output                0 ml   Net             1620 ml       Nutrition  Orders Placed This Encounter   Procedures   • Diet Order Low Fiber(GI Soft)     Standing Status:   Standing     Number of Occurrences:   1     Order Specific Question:   Diet:     Answer:   Low Fiber(GI Soft) [2]   • Diet NPO     Standing Status:   Standing     Number of Occurrences:   8     Order Specific Question:   Restrict to:     Answer:   Sips with Medications [3]     Physical Exam   Constitutional: He is oriented to person, place, and time. He appears well-developed and well-nourished.   HENT:   Head: Normocephalic.   Eyes: Pupils are equal, round, and reactive to light. EOM are normal.   Neck: Normal range of motion. Neck supple. No JVD present. No thyromegaly present.   Cardiovascular: Normal rate, regular rhythm and normal heart sounds.  Exam reveals no gallop and no friction rub.    No murmur heard.  Pulmonary/Chest: Effort normal and breath sounds normal. No respiratory distress. He has no wheezes. He has no rales. He exhibits no tenderness.   Abdominal: Soft. Bowel sounds are normal. He exhibits mass. He exhibits no distension. There is no tenderness. There is no rebound and no guarding.   Musculoskeletal: Normal range of motion. He exhibits no edema.   Lymphadenopathy:     He has no cervical adenopathy.   Neurological: He is alert and oriented to person, place, and time. He displays normal reflexes. No cranial nerve deficit.   Skin: Skin is dry. No rash noted.   Psychiatric: He has a normal mood and affect.   Nursing note and vitals  reviewed.      Recent Labs      11/05/18   0345   WBC  5.8   RBC  4.43*   HEMOGLOBIN  12.3*   HEMATOCRIT  38.2*   MCV  86.2   MCH  27.8   MCHC  32.2*   RDW  41.4   PLATELETCT  254   MPV  10.6     Recent Labs      11/05/18   0345   SODIUM  137   POTASSIUM  3.8   CHLORIDE  104   CO2  24   GLUCOSE  93   BUN  6*   CREATININE  0.79   CALCIUM  9.7     Recent Labs      11/05/18   0345   INR  1.07               MR-BRAIN-WITH & W/O   Final Result      1.  Mild ventriculomegaly of the lateral ventricles and third ventricle. Developmental discontinuity of the septum pellucidum posteriorly.   2.  Mild supratentorial white matter disease most consistent with microvascular ischemic change.   3.  No evidence of acute infarction, hemorrhage, or enhancing mass lesion. No evidence of brain metastasis.      CT-HEAD W/O   Final Result         1.  There are nonspecific white matter changes, commonly associated with small vessel ischemic disease.  Associated mild cerebral atrophy is noted.   2.  Bilateral ventricular mild dilatation, could correspond with ex vacuo changes, consider component of hydrocephalus as clinically appropriate.      CT-CHEST (THORAX) W/O   Final Result         1.  Right upper lobe lobulated and spiculated pulmonary mass. Should be considered neoplastic unless proven otherwise.   2.  Left apical pulmonary nodule with spiculation, should be considered neoplastic unless proven otherwise.   3.  Abdominal masses along the peritoneum and omentum, appearance favors abdominal carcinomatosis.            OUTSIDE IMAGES-DX CHEST   Final Result      OUTSIDE IMAGES-CT ABDOMEN /PELVIS   Final Result      CT-NEEDLE BX-ABD-RETROPERITONL    (Results Pending)        Assessment/Plan     Partial small bowel obstruction (HCC)- (present on admission)   Assessment & Plan    Per surgery consultation, no small obstruction noted.  Recommend IR guided peritoneal biopsy for diagnosis of abdominal mass.  IR does not have open schedule for  biopsy today.  Continue diet for patient, n.p.o. overnight.  Patient currently does have normal bowel function.     Abdominal mass- (present on admission)   Assessment & Plan    Concerning for metastatic disease  IR has been consulted for image guided biopsy  We will consider consulting oncology for further recommendations after path report  Pain control with oxycodone and IV morphine, monitor respiratory status closely  Monitor  CEA (+) but CA 19-9 (-)     HTN (hypertension)- (present on admission)   Assessment & Plan    IV hydralazine as needed for SBP greater than 160  stable     Class 1 obesity in adult- (present on admission)   Assessment & Plan    Body mass index is 31.16 kg/m².   Close monitor and weight reduction education provided       HLD (hyperlipidemia)- (present on admission)   Assessment & Plan    Continue gemfibrozil     Hypothyroid- (present on admission)   Assessment & Plan    Check TSH  Continue Synthroid       Quality-Core Measures   Reviewed items::  Labs reviewed, Medications reviewed and Radiology images reviewed  Lowe catheter::  No Lowe  DVT prophylaxis - mechanical:  SCDs  Ulcer Prophylaxis::  Not indicated     I have discussed with RN and ZACHARY and SW and other consultants about patient's plan.       Current Facility-Administered Medications:   •  enoxaparin (LOVENOX) inj 40 mg, 40 mg, Subcutaneous, DAILY, Jessica Sands M.D.  •  Respiratory Care per Protocol, , Nebulization, Continuous RT, Jonathan Matson M.D.  •  ondansetron (ZOFRAN) syringe/vial injection 4 mg, 4 mg, Intravenous, Q4HRS PRN, Jonathan Matson M.D.  •  ondansetron (ZOFRAN ODT) dispertab 4 mg, 4 mg, Oral, Q4HRS PRN, Jonathan Matson M.D.  •  promethazine (PHENERGAN) tablet 12.5-25 mg, 12.5-25 mg, Oral, Q4HRS PRN, Jonathan Matson M.D.  •  promethazine (PHENERGAN) suppository 12.5-25 mg, 12.5-25 mg, Rectal, Q4HRS PRN, Jonathan Matson M.D.  •  prochlorperazine (COMPAZINE) injection 5-10 mg, 5-10 mg, Intravenous, Q4HRS PRN, Jonathan Matson M.D.  •   acetaminophen (TYLENOL) tablet 650 mg, 650 mg, Oral, Q6HRS PRN, Jonathan Matson M.D., 650 mg at 11/04/18 2150  •  Notify provider if pain remains uncontrolled, , , CONTINUOUS **AND** Use the numeric rating scale (NRS-11) on regular floors and Critical-Care Pain Observation Tool (CPOT) on ICUs/Trauma to assess pain, , , CONTINUOUS **AND** Pulse Ox (Oximetry), , , CONTINUOUS **AND** Pharmacy Consult Request ...Pain Management Review, , Other, PRN **AND** If patient difficult to arouse and/or has respiratory depression, stop any opiates that are currently infusing and call a Rapid Response., , , CONTINUOUS **AND** oxyCODONE immediate-release (ROXICODONE) tablet 5 mg, 5 mg, Oral, Q3HRS PRN, 5 mg at 11/05/18 1119 **AND** oxyCODONE immediate-release (ROXICODONE) tablet 10 mg, 10 mg, Oral, Q3HRS PRN, 10 mg at 11/04/18 2307 **AND** morphine (pf) 4 mg/ml injection 4 mg, 4 mg, Intravenous, Q3HRS PRN, Jonathan Matson M.D., 4 mg at 11/05/18 1211  •  hydrALAZINE (APRESOLINE) injection 20 mg, 20 mg, Intravenous, Q6HRS PRN, Jonathan Matson M.D.  •  gemfibrozil (LOPID) tablet 600 mg, 600 mg, Oral, BID, Jonathan Matson M.D., Stopped at 11/05/18 0600  •  levothyroxine (SYNTHROID) tablet 50 mcg, 50 mcg, Oral, AM ES, Jonathan Matson M.D., Stopped at 11/05/18 0600

## 2018-11-05 NOTE — CARE PLAN
Problem: Pain Management  Goal: Pain level will decrease to patient's comfort goal  Outcome: PROGRESSING AS EXPECTED  Pt educated on the 1-10 pain scale. Pt verbalizes understanding. Pt states that current drug regimen is effectively controlling pain.     Problem: Safety  Goal: Will remain free from injury  Outcome: PROGRESSING AS EXPECTED  Patient educated on the importance of calling a staff member before getting out of bed. Patient verbalizes understanding and patient calling appropriately. Bed in lowest position, call light and other belongings within reach, treaded socks on, and hourly rounding in place. Bed alarm off

## 2018-11-05 NOTE — PROGRESS NOTES
Pt A&Ox4, sitting up in bed.  States pain is resolving after PRN medication. 2/10.  Abdomen semi-firm, semi distended. Passing gas. Denies nausea. States having appetite.   NPO for procedure today. + bowel sounds, + flatus, LBM this AM 11/5.  Saturating >90% on RA.  Pt ambulates independently, demonstrates steady gait. Small limp noted, ambulates with cane at home.  Updated on plan of care. Safety education provided. Bed locked in low. Call light within reach. Rounding in place.

## 2018-11-05 NOTE — PROGRESS NOTES
This RN received report from [facility] nurse. Pt was transferred to [facility] unit. Pt is tucked in and all needs have been met at this time.

## 2018-11-05 NOTE — CARE PLAN
Problem: Pain Management  Goal: Pain level will decrease to patient's comfort goal    Intervention: Educate and implement non-pharmacologic comfort measures. Examples: relaxation, distration, play therapy, activity therapy, massage, etc.   11/04/18 1954   OTHER   Intervention Medication (see MAR)         Problem: Safety  Goal: Will remain free from injury    Intervention: Provide assistance with mobility   11/04/18 1954   OTHER   Assistance / Tolerance No assistance required   Educated pt to still use his call light when needing assistance

## 2018-11-05 NOTE — CARE PLAN
Problem: Pain Management  Goal: Pain level will decrease to patient's comfort goal  Outcome: PROGRESSING AS EXPECTED  Intermittent abdominal pain. PRN medication available.     Problem: Bowel/Gastric:  Goal: Normal bowel function is maintained or improved  Outcome: PROGRESSING AS EXPECTED  Tolerating diet. +appetite, +bowel sounds, +flatus. Denies n/v.  Having regular BM's. LBM this AM 11/5.    Problem: Discharge Barriers/Planning  Goal: Patient's continuum of care needs will be met  Outcome: PROGRESSING AS EXPECTED  Educated pt on ability to follow up outpatient to receive biopsy. Pt requested to stay in hospital in preparation for possible inpatient availability tomorrow.

## 2018-11-06 ENCOUNTER — APPOINTMENT (OUTPATIENT)
Dept: RADIOLOGY | Facility: MEDICAL CENTER | Age: 48
DRG: 375 | End: 2018-11-06
Attending: INTERNAL MEDICINE
Payer: MEDICAID

## 2018-11-06 LAB
ANION GAP SERPL CALC-SCNC: 9 MMOL/L (ref 0–11.9)
BASOPHILS # BLD AUTO: 0.7 % (ref 0–1.8)
BASOPHILS # BLD: 0.05 K/UL (ref 0–0.12)
BUN SERPL-MCNC: 10 MG/DL (ref 8–22)
CALCIUM SERPL-MCNC: 9.8 MG/DL (ref 8.5–10.5)
CHLORIDE SERPL-SCNC: 100 MMOL/L (ref 96–112)
CO2 SERPL-SCNC: 25 MMOL/L (ref 20–33)
CREAT SERPL-MCNC: 0.91 MG/DL (ref 0.5–1.4)
EOSINOPHIL # BLD AUTO: 0.31 K/UL (ref 0–0.51)
EOSINOPHIL NFR BLD: 4.5 % (ref 0–6.9)
ERYTHROCYTE [DISTWIDTH] IN BLOOD BY AUTOMATED COUNT: 40.8 FL (ref 35.9–50)
GLUCOSE SERPL-MCNC: 101 MG/DL (ref 65–99)
HCT VFR BLD AUTO: 38 % (ref 42–52)
HGB BLD-MCNC: 12.4 G/DL (ref 14–18)
IMM GRANULOCYTES # BLD AUTO: 0.03 K/UL (ref 0–0.11)
IMM GRANULOCYTES NFR BLD AUTO: 0.4 % (ref 0–0.9)
LYMPHOCYTES # BLD AUTO: 1.76 K/UL (ref 1–4.8)
LYMPHOCYTES NFR BLD: 25.4 % (ref 22–41)
MCH RBC QN AUTO: 27.9 PG (ref 27–33)
MCHC RBC AUTO-ENTMCNC: 32.6 G/DL (ref 33.7–35.3)
MCV RBC AUTO: 85.6 FL (ref 81.4–97.8)
MONOCYTES # BLD AUTO: 0.78 K/UL (ref 0–0.85)
MONOCYTES NFR BLD AUTO: 11.3 % (ref 0–13.4)
NEUTROPHILS # BLD AUTO: 3.99 K/UL (ref 1.82–7.42)
NEUTROPHILS NFR BLD: 57.7 % (ref 44–72)
NRBC # BLD AUTO: 0 K/UL
NRBC BLD-RTO: 0 /100 WBC
PLATELET # BLD AUTO: 284 K/UL (ref 164–446)
PMV BLD AUTO: 10.6 FL (ref 9–12.9)
POTASSIUM SERPL-SCNC: 3.8 MMOL/L (ref 3.6–5.5)
RBC # BLD AUTO: 4.44 M/UL (ref 4.7–6.1)
SODIUM SERPL-SCNC: 134 MMOL/L (ref 135–145)
WBC # BLD AUTO: 6.9 K/UL (ref 4.8–10.8)

## 2018-11-06 PROCEDURE — 80048 BASIC METABOLIC PNL TOTAL CA: CPT

## 2018-11-06 PROCEDURE — 99232 SBSQ HOSP IP/OBS MODERATE 35: CPT | Performed by: INTERNAL MEDICINE

## 2018-11-06 PROCEDURE — 700111 HCHG RX REV CODE 636 W/ 250 OVERRIDE (IP): Performed by: RADIOLOGY

## 2018-11-06 PROCEDURE — 770001 HCHG ROOM/CARE - MED/SURG/GYN PRIV*

## 2018-11-06 PROCEDURE — 88305 TISSUE EXAM BY PATHOLOGIST: CPT

## 2018-11-06 PROCEDURE — 88342 IMHCHEM/IMCYTCHM 1ST ANTB: CPT

## 2018-11-06 PROCEDURE — 85025 COMPLETE CBC W/AUTO DIFF WBC: CPT

## 2018-11-06 PROCEDURE — 49180 BIOPSY ABDOMINAL MASS: CPT

## 2018-11-06 PROCEDURE — A9270 NON-COVERED ITEM OR SERVICE: HCPCS | Performed by: INTERNAL MEDICINE

## 2018-11-06 PROCEDURE — 0DBW3ZX EXCISION OF PERITONEUM, PERCUTANEOUS APPROACH, DIAGNOSTIC: ICD-10-PCS | Performed by: RADIOLOGY

## 2018-11-06 PROCEDURE — 700102 HCHG RX REV CODE 250 W/ 637 OVERRIDE(OP): Performed by: INTERNAL MEDICINE

## 2018-11-06 PROCEDURE — 700111 HCHG RX REV CODE 636 W/ 250 OVERRIDE (IP): Performed by: INTERNAL MEDICINE

## 2018-11-06 PROCEDURE — 700111 HCHG RX REV CODE 636 W/ 250 OVERRIDE (IP)

## 2018-11-06 PROCEDURE — 88341 IMHCHEM/IMCYTCHM EA ADD ANTB: CPT | Mod: 91

## 2018-11-06 PROCEDURE — 36415 COLL VENOUS BLD VENIPUNCTURE: CPT

## 2018-11-06 RX ORDER — NALOXONE HYDROCHLORIDE 0.4 MG/ML
INJECTION, SOLUTION INTRAMUSCULAR; INTRAVENOUS; SUBCUTANEOUS
Status: COMPLETED
Start: 2018-11-06 | End: 2018-11-06

## 2018-11-06 RX ORDER — SODIUM CHLORIDE 9 MG/ML
500 INJECTION, SOLUTION INTRAVENOUS
Status: ACTIVE | OUTPATIENT
Start: 2018-11-06 | End: 2018-11-06

## 2018-11-06 RX ORDER — MIDAZOLAM HYDROCHLORIDE 1 MG/ML
INJECTION INTRAMUSCULAR; INTRAVENOUS
Status: COMPLETED
Start: 2018-11-06 | End: 2018-11-06

## 2018-11-06 RX ORDER — MIDAZOLAM HYDROCHLORIDE 1 MG/ML
.5-2 INJECTION INTRAMUSCULAR; INTRAVENOUS PRN
Status: ACTIVE | OUTPATIENT
Start: 2018-11-06 | End: 2018-11-06

## 2018-11-06 RX ORDER — LIDOCAINE HYDROCHLORIDE 10 MG/ML
INJECTION, SOLUTION EPIDURAL; INFILTRATION; INTRACAUDAL; PERINEURAL
Status: COMPLETED
Start: 2018-11-06 | End: 2018-11-06

## 2018-11-06 RX ORDER — ONDANSETRON 2 MG/ML
4 INJECTION INTRAMUSCULAR; INTRAVENOUS PRN
Status: ACTIVE | OUTPATIENT
Start: 2018-11-06 | End: 2018-11-06

## 2018-11-06 RX ADMIN — GEMFIBROZIL 600 MG: 600 TABLET ORAL at 17:49

## 2018-11-06 RX ADMIN — MIDAZOLAM 1 MG: 1 INJECTION INTRAMUSCULAR; INTRAVENOUS at 17:15

## 2018-11-06 RX ADMIN — LIDOCAINE HYDROCHLORIDE 10 ML: 10 INJECTION, SOLUTION EPIDURAL; INFILTRATION; INTRACAUDAL; PERINEURAL at 17:12

## 2018-11-06 RX ADMIN — MIDAZOLAM 2 MG: 1 INJECTION INTRAMUSCULAR; INTRAVENOUS at 17:12

## 2018-11-06 RX ADMIN — MORPHINE SULFATE 4 MG: 4 INJECTION INTRAVENOUS at 15:43

## 2018-11-06 RX ADMIN — FENTANYL CITRATE 50 MCG: 50 INJECTION, SOLUTION INTRAMUSCULAR; INTRAVENOUS at 17:12

## 2018-11-06 RX ADMIN — MORPHINE SULFATE 4 MG: 4 INJECTION INTRAVENOUS at 05:25

## 2018-11-06 RX ADMIN — MORPHINE SULFATE 4 MG: 4 INJECTION INTRAVENOUS at 08:55

## 2018-11-06 RX ADMIN — MORPHINE SULFATE 4 MG: 4 INJECTION INTRAVENOUS at 12:40

## 2018-11-06 RX ADMIN — OXYCODONE HYDROCHLORIDE 10 MG: 10 TABLET ORAL at 19:05

## 2018-11-06 RX ADMIN — OXYCODONE HYDROCHLORIDE 10 MG: 10 TABLET ORAL at 23:54

## 2018-11-06 ASSESSMENT — ENCOUNTER SYMPTOMS
SEIZURES: 0
EYE PAIN: 0
ABDOMINAL PAIN: 1
DIZZINESS: 0
SHORTNESS OF BREATH: 0
CHILLS: 0
DIARRHEA: 0
VOMITING: 0
COUGH: 0
DEPRESSION: 0
WEIGHT LOSS: 0
PALPITATIONS: 0
FALLS: 0
HEARTBURN: 0
HEADACHES: 0
FEVER: 0
WHEEZING: 0
NAUSEA: 0
BLURRED VISION: 0
WEAKNESS: 0
BACK PAIN: 0

## 2018-11-06 ASSESSMENT — PAIN SCALES - GENERAL
PAINLEVEL_OUTOF10: 7
PAINLEVEL_OUTOF10: 7
PAINLEVEL_OUTOF10: 0
PAINLEVEL_OUTOF10: 6
PAINLEVEL_OUTOF10: 4
PAINLEVEL_OUTOF10: 4

## 2018-11-06 ASSESSMENT — LIFESTYLE VARIABLES: SUBSTANCE_ABUSE: 0

## 2018-11-06 NOTE — CARE PLAN
Problem: Pain Management  Goal: Pain level will decrease to patient's comfort goal    Intervention: Educate and implement non-pharmacologic comfort measures. Examples: relaxation, distration, play therapy, activity therapy, massage, etc.   11/05/18 1918   OTHER   Intervention Rest;Repositioned         Problem: Fluid Volume:  Goal: Will maintain balanced intake and output    Intervention: Monitor, educate, and encourage compliance with therapeutic intake of liquids  Educate pt to have adequate PO intake.

## 2018-11-06 NOTE — PROGRESS NOTES
"Patient c/o abdominal pain this am well managed with medications. NPO for CT guided biopsy. Radiology called and will take patient this am. Ambulating independently. +BM today, +Flatus. MD orders reviewed. /90   Pulse 93   Temp 36.8 °C (98.3 °F)   Resp 16   Ht 1.778 m (5' 10\")   Wt 98.5 kg (217 lb 2.5 oz)   SpO2 96%   BMI 31.16 kg/m²     "

## 2018-11-06 NOTE — PROGRESS NOTES
[facility] Hospitalist Progress Note    Date of Service: 2018    Chief Complaint  48 y.o. male admitted 11/3/2018 with complaint of abdominal pain.  Patient was noticed to have abdominal mass and partial small bowel obstruction initially.  Surgery was consulted and recommend IR guided biopsy to characterize abdominal mass.  Also recommend oncology follow-up if diagnosed with cancer.  Patient currently does not have small bowel obstruction.  Patient can tolerate diet.    Interval Problem Update  .  Patient wants to eat.  Discontinue IV fluid because IR cannot perform biopsy today.  Will await for tomorrow. Patient's pain is local, 3-4/10, intermittent and does not radiate to other location, sharp and with some tingling. Can be controlled by pain meds.  Continue symptom management.  Other options including outpatient biopsy by IR.  : Pt seen and examined, afebrile, no overnight events, no nausea or vomiting.  Plan for IR biopsy today.      Consultants/Specialty  Sx  IR    Disposition  TBD        Review of Systems   Constitutional: Negative for chills, fever and weight loss.   HENT: Negative for congestion, ear pain and hearing loss.    Eyes: Negative for blurred vision and pain.   Respiratory: Negative for cough, shortness of breath and wheezing.    Cardiovascular: Negative for chest pain and palpitations.   Gastrointestinal: Positive for abdominal pain. Negative for diarrhea, heartburn, nausea and vomiting.   Genitourinary: Negative for dysuria, frequency and hematuria.   Musculoskeletal: Negative for back pain and falls.   Skin: Negative for itching and rash.   Neurological: Negative for dizziness, seizures, weakness and headaches.   Psychiatric/Behavioral: Negative for depression, substance abuse and suicidal ideas.      Physical Exam  Laboratory/Imaging   Hemodynamics  Temp (24hrs), Av.9 °C (98.4 °F), Min:36.4 °C (97.6 °F), Max:37.6 °C (99.6 °F)   Temperature: 36.7 °C (98 °F)  Pulse  Av.5  Min:  76  Max: 103    Blood Pressure: 116/90      Respiratory      Respiration: 18, Pulse Oximetry: 95 %        RUL Breath Sounds: Clear, RML Breath Sounds: Clear, RLL Breath Sounds: Diminished, THERESA Breath Sounds: Diminished, LLL Breath Sounds: Diminished    Fluids    Intake/Output Summary (Last 24 hours) at 11/06/18 1412  Last data filed at 11/06/18 0400   Gross per 24 hour   Intake              990 ml   Output                0 ml   Net              990 ml       Nutrition  Orders Placed This Encounter   Procedures   • Diet NPO     Standing Status:   Standing     Number of Occurrences:   8     Order Specific Question:   Restrict to:     Answer:   Sips with Medications [3]     Physical Exam   Constitutional: He is oriented to person, place, and time. He appears well-developed and well-nourished.   HENT:   Head: Normocephalic.   Eyes: Pupils are equal, round, and reactive to light. EOM are normal.   Neck: Normal range of motion. Neck supple. No JVD present.   Cardiovascular: Normal rate, regular rhythm and normal heart sounds.  Exam reveals no gallop and no friction rub.    No murmur heard.  Pulmonary/Chest: Effort normal and breath sounds normal. No respiratory distress. He has no wheezes. He has no rales. He exhibits no tenderness.   Abdominal: Soft. Bowel sounds are normal. He exhibits mass. He exhibits no distension. There is no tenderness.   Musculoskeletal: Normal range of motion. He exhibits no edema.   Lymphadenopathy:     He has no cervical adenopathy.   Neurological: He is alert and oriented to person, place, and time. He displays normal reflexes. No cranial nerve deficit.   Skin: Skin is dry. No rash noted. No erythema.   Psychiatric: He has a normal mood and affect.   Nursing note and vitals reviewed.      Recent Labs      11/05/18   0345  11/06/18   0337   WBC  5.8  6.9   RBC  4.43*  4.44*   HEMOGLOBIN  12.3*  12.4*   HEMATOCRIT  38.2*  38.0*   MCV  86.2  85.6   MCH  27.8  27.9   MCHC  32.2*  32.6*   RDW  41.4   40.8   PLATELETCT  254  284   MPV  10.6  10.6     Recent Labs      11/05/18   0345  11/06/18   0337   SODIUM  137  134*   POTASSIUM  3.8  3.8   CHLORIDE  104  100   CO2  24  25   GLUCOSE  93  101*   BUN  6*  10   CREATININE  0.79  0.91   CALCIUM  9.7  9.8     Recent Labs      11/05/18   0345   INR  1.07               MR-BRAIN-WITH & W/O   Final Result      1.  Mild ventriculomegaly of the lateral ventricles and third ventricle. Developmental discontinuity of the septum pellucidum posteriorly.   2.  Mild supratentorial white matter disease most consistent with microvascular ischemic change.   3.  No evidence of acute infarction, hemorrhage, or enhancing mass lesion. No evidence of brain metastasis.      CT-HEAD W/O   Final Result         1.  There are nonspecific white matter changes, commonly associated with small vessel ischemic disease.  Associated mild cerebral atrophy is noted.   2.  Bilateral ventricular mild dilatation, could correspond with ex vacuo changes, consider component of hydrocephalus as clinically appropriate.      CT-CHEST (THORAX) W/O   Final Result         1.  Right upper lobe lobulated and spiculated pulmonary mass. Should be considered neoplastic unless proven otherwise.   2.  Left apical pulmonary nodule with spiculation, should be considered neoplastic unless proven otherwise.   3.  Abdominal masses along the peritoneum and omentum, appearance favors abdominal carcinomatosis.            OUTSIDE IMAGES-DX CHEST   Final Result      OUTSIDE IMAGES-CT ABDOMEN /PELVIS   Final Result      CT-NEEDLE BX-ABD-RETROPERITONL    (Results Pending)        Assessment/Plan     Partial small bowel obstruction (HCC)- (present on admission)   Assessment & Plan    Per surgery consultation, no small obstruction noted.  Recommend IR guided peritoneal biopsy for diagnosis of abdominal mass.  Plan for biopsy today   Patient currently does have normal bowel function.     Abdominal mass- (present on admission)    Assessment & Plan    Concerning for metastatic disease  IR has been consulted for image guided biopsy  We will consider consulting oncology for further recommendations after path report  Pain control with oxycodone and IV morphine, monitor respiratory status closely  Monitor  CEA (+) but CA 19-9 (-)     HTN (hypertension)- (present on admission)   Assessment & Plan    IV hydralazine as needed for SBP greater than 160  stable     Class 1 obesity in adult- (present on admission)   Assessment & Plan    Body mass index is 31.16 kg/m².   Close monitor and weight reduction education provided       HLD (hyperlipidemia)- (present on admission)   Assessment & Plan    Continue gemfibrozil     Hypothyroid- (present on admission)   Assessment & Plan    Check TSH  Continue Synthroid       Quality-Core Measures   Reviewed items::  Labs reviewed, Medications reviewed and Radiology images reviewed  Lowe catheter::  No Lowe  DVT prophylaxis - mechanical:  SCDs  Ulcer Prophylaxis::  Not indicated     I have discussed with RN and ZACHARY and SW and other consultants about patient's plan.       Current Facility-Administered Medications:   •  enoxaparin (LOVENOX) inj 40 mg, 40 mg, Subcutaneous, DAILY, Jessica Sands M.D., Stopped at 11/06/18 0600  •  Respiratory Care per Protocol, , Nebulization, Continuous RT, Jonathan Matson M.D.  •  ondansetron (ZOFRAN) syringe/vial injection 4 mg, 4 mg, Intravenous, Q4HRS PRN, Jonathan Matson M.D.  •  ondansetron (ZOFRAN ODT) dispertab 4 mg, 4 mg, Oral, Q4HRS PRN, Jonathan Matson M.D.  •  promethazine (PHENERGAN) tablet 12.5-25 mg, 12.5-25 mg, Oral, Q4HRS PRN, Jonathan Matson M.D.  •  promethazine (PHENERGAN) suppository 12.5-25 mg, 12.5-25 mg, Rectal, Q4HRS PRN, Jonathan Matson M.D.  •  prochlorperazine (COMPAZINE) injection 5-10 mg, 5-10 mg, Intravenous, Q4HRS PRN, Jonathan Matson M.D.  •  acetaminophen (TYLENOL) tablet 650 mg, 650 mg, Oral, Q6HRS PRN, Jonathan Matson M.D., 650 mg at 11/04/18 7400  •  Notify provider if  pain remains uncontrolled, , , CONTINUOUS **AND** Use the numeric rating scale (NRS-11) on regular floors and Critical-Care Pain Observation Tool (CPOT) on ICUs/Trauma to assess pain, , , CONTINUOUS **AND** Pulse Ox (Oximetry), , , CONTINUOUS **AND** Pharmacy Consult Request ...Pain Management Review, , Other, PRN **AND** If patient difficult to arouse and/or has respiratory depression, stop any opiates that are currently infusing and call a Rapid Response., , , CONTINUOUS **AND** oxyCODONE immediate-release (ROXICODONE) tablet 5 mg, 5 mg, Oral, Q3HRS PRN, 5 mg at 11/05/18 1119 **AND** oxyCODONE immediate-release (ROXICODONE) tablet 10 mg, 10 mg, Oral, Q3HRS PRN, 10 mg at 11/05/18 2220 **AND** morphine (pf) 4 mg/ml injection 4 mg, 4 mg, Intravenous, Q3HRS PRN, Jonathna Matson M.D., 4 mg at 11/06/18 1240  •  hydrALAZINE (APRESOLINE) injection 20 mg, 20 mg, Intravenous, Q6HRS PRN, Jonathan Matson M.D.  •  gemfibrozil (LOPID) tablet 600 mg, 600 mg, Oral, BID, Jonathan Matson M.D., Stopped at 11/06/18 0600  •  levothyroxine (SYNTHROID) tablet 50 mcg, 50 mcg, Oral, AM ES, Jonathan Matson M.D., Stopped at 11/05/18 0600

## 2018-11-07 LAB
ANION GAP SERPL CALC-SCNC: 10 MMOL/L (ref 0–11.9)
BUN SERPL-MCNC: 9 MG/DL (ref 8–22)
CALCIUM SERPL-MCNC: 10 MG/DL (ref 8.5–10.5)
CHLORIDE SERPL-SCNC: 100 MMOL/L (ref 96–112)
CO2 SERPL-SCNC: 23 MMOL/L (ref 20–33)
CREAT SERPL-MCNC: 0.89 MG/DL (ref 0.5–1.4)
ERYTHROCYTE [DISTWIDTH] IN BLOOD BY AUTOMATED COUNT: 41.2 FL (ref 35.9–50)
GLUCOSE SERPL-MCNC: 110 MG/DL (ref 65–99)
HCT VFR BLD AUTO: 39.3 % (ref 42–52)
HGB BLD-MCNC: 12.8 G/DL (ref 14–18)
MCH RBC QN AUTO: 27.6 PG (ref 27–33)
MCHC RBC AUTO-ENTMCNC: 32.6 G/DL (ref 33.7–35.3)
MCV RBC AUTO: 84.7 FL (ref 81.4–97.8)
PATHOLOGY CONSULT NOTE: NORMAL
PLATELET # BLD AUTO: 309 K/UL (ref 164–446)
PMV BLD AUTO: 10.5 FL (ref 9–12.9)
POTASSIUM SERPL-SCNC: 4.1 MMOL/L (ref 3.6–5.5)
RBC # BLD AUTO: 4.64 M/UL (ref 4.7–6.1)
SODIUM SERPL-SCNC: 133 MMOL/L (ref 135–145)
WBC # BLD AUTO: 9.4 K/UL (ref 4.8–10.8)

## 2018-11-07 PROCEDURE — 85027 COMPLETE CBC AUTOMATED: CPT

## 2018-11-07 PROCEDURE — 36415 COLL VENOUS BLD VENIPUNCTURE: CPT

## 2018-11-07 PROCEDURE — 80048 BASIC METABOLIC PNL TOTAL CA: CPT

## 2018-11-07 PROCEDURE — 700111 HCHG RX REV CODE 636 W/ 250 OVERRIDE (IP): Performed by: INTERNAL MEDICINE

## 2018-11-07 PROCEDURE — 770001 HCHG ROOM/CARE - MED/SURG/GYN PRIV*

## 2018-11-07 PROCEDURE — A9270 NON-COVERED ITEM OR SERVICE: HCPCS | Performed by: INTERNAL MEDICINE

## 2018-11-07 PROCEDURE — 700102 HCHG RX REV CODE 250 W/ 637 OVERRIDE(OP): Performed by: INTERNAL MEDICINE

## 2018-11-07 PROCEDURE — 99232 SBSQ HOSP IP/OBS MODERATE 35: CPT | Performed by: INTERNAL MEDICINE

## 2018-11-07 RX ADMIN — OXYCODONE HYDROCHLORIDE 10 MG: 10 TABLET ORAL at 18:09

## 2018-11-07 RX ADMIN — GEMFIBROZIL 600 MG: 600 TABLET ORAL at 16:38

## 2018-11-07 RX ADMIN — GEMFIBROZIL 600 MG: 600 TABLET ORAL at 05:57

## 2018-11-07 RX ADMIN — OXYCODONE HYDROCHLORIDE 10 MG: 10 TABLET ORAL at 14:20

## 2018-11-07 RX ADMIN — OXYCODONE HYDROCHLORIDE 10 MG: 10 TABLET ORAL at 03:32

## 2018-11-07 RX ADMIN — PROCHLORPERAZINE EDISYLATE 10 MG: 5 INJECTION INTRAMUSCULAR; INTRAVENOUS at 16:37

## 2018-11-07 RX ADMIN — OXYCODONE HYDROCHLORIDE 10 MG: 10 TABLET ORAL at 07:32

## 2018-11-07 RX ADMIN — LEVOTHYROXINE SODIUM 50 MCG: 50 TABLET ORAL at 05:57

## 2018-11-07 RX ADMIN — OXYCODONE HYDROCHLORIDE 10 MG: 10 TABLET ORAL at 11:00

## 2018-11-07 ASSESSMENT — ENCOUNTER SYMPTOMS
VOMITING: 0
NAUSEA: 0
BLURRED VISION: 0
FALLS: 0
DEPRESSION: 0
BACK PAIN: 0
HEARTBURN: 0
DIARRHEA: 0
SHORTNESS OF BREATH: 0
FEVER: 0
ABDOMINAL PAIN: 1
WEIGHT LOSS: 0
WEAKNESS: 0
EYE PAIN: 0
HEADACHES: 0
DIZZINESS: 0
WHEEZING: 0
COUGH: 0
PALPITATIONS: 0
SEIZURES: 0
CHILLS: 0

## 2018-11-07 ASSESSMENT — PAIN SCALES - GENERAL
PAINLEVEL_OUTOF10: 4
PAINLEVEL_OUTOF10: 6
PAINLEVEL_OUTOF10: 4
PAINLEVEL_OUTOF10: 6
PAINLEVEL_OUTOF10: 4

## 2018-11-07 ASSESSMENT — LIFESTYLE VARIABLES: SUBSTANCE_ABUSE: 0

## 2018-11-07 NOTE — OR SURGEON
Immediate Post- Operative Note        PostOp Diagnosis: Peritoneal nodule      Procedure(s): CT guided Bx    Estimated Blood Loss: Less than 5 ml        Complications: None            11/6/2018     6:03 PM     Silvano Nieves

## 2018-11-07 NOTE — PROGRESS NOTES
"Patient down to CT with transport. Blood pressure 116/90, pulse 76, temperature 36.7 °C (98 °F), resp. rate 18, height 1.778 m (5' 10\"), weight 98.5 kg (217 lb 2.5 oz), SpO2 95 %.    s  "

## 2018-11-07 NOTE — PROGRESS NOTES
[facility]ist Progress Note    Date of Service: 2018    Chief Complaint  48 y.o. male admitted 11/3/2018 with complaint of abdominal pain.  Patient was noticed to have abdominal mass and partial small bowel obstruction initially.  Surgery was consulted and recommend IR guided biopsy to characterize abdominal mass.  Also recommend oncology follow-up if diagnosed with cancer.  Patient currently does not have small bowel obstruction.  Patient can tolerate diet.    Interval Problem Update  .  Patient wants to eat.  Discontinue IV fluid because IR cannot perform biopsy today.  Will await for tomorrow. Patient's pain is local, 3-4/10, intermittent and does not radiate to other location, sharp and with some tingling. Can be controlled by pain meds.  Continue symptom management.  Other options including outpatient biopsy by IR.  : Pt seen and examined, afebrile, no overnight events, no nausea or vomiting.  Plan for IR biopsy today.  : No overnight events overnight, afebrile, no abdominal pain, had biopsy done yesterday, path report pending     Consultants/Specialty  Sx  IR    Disposition  TBD        Review of Systems   Constitutional: Negative for chills, fever and weight loss.   HENT: Negative for congestion, ear pain and hearing loss.    Eyes: Negative for blurred vision and pain.   Respiratory: Negative for cough, shortness of breath and wheezing.    Cardiovascular: Negative for chest pain and palpitations.   Gastrointestinal: Positive for abdominal pain. Negative for diarrhea, heartburn, nausea and vomiting.   Genitourinary: Negative for dysuria, frequency and hematuria.   Musculoskeletal: Negative for back pain and falls.   Skin: Negative for itching and rash.   Neurological: Negative for dizziness, seizures, weakness and headaches.   Psychiatric/Behavioral: Negative for depression, substance abuse and suicidal ideas.      Physical Exam  Laboratory/Imaging   Hemodynamics  Temp (24hrs), Av °C  (98.6 °F), Min:36.4 °C (97.5 °F), Max:37.4 °C (99.3 °F)   Temperature: 37.3 °C (99.2 °F)  Pulse  Av.9  Min: 76  Max: 103    Blood Pressure: 115/84      Respiratory      Respiration: 16, Pulse Oximetry: 95 %        RUL Breath Sounds: Clear, RML Breath Sounds: Clear, RLL Breath Sounds: Diminished, THERESA Breath Sounds: Diminished, LLL Breath Sounds: Diminished    Fluids    Intake/Output Summary (Last 24 hours) at 18 1118  Last data filed at 18 0900   Gross per 24 hour   Intake              360 ml   Output                0 ml   Net              360 ml       Nutrition  Orders Placed This Encounter   Procedures   • Diet Order Low Fiber(GI Soft), Diabetic     Standing Status:   Standing     Number of Occurrences:   1     Order Specific Question:   Diet:     Answer:   Low Fiber(GI Soft) [2]     Order Specific Question:   Diet:     Answer:   Diabetic [3]     Physical Exam   Constitutional: He is oriented to person, place, and time. He appears well-developed and well-nourished.   HENT:   Head: Normocephalic.   Eyes: Pupils are equal, round, and reactive to light. EOM are normal.   Neck: Normal range of motion. Neck supple. No JVD present.   Cardiovascular: Normal rate, regular rhythm and normal heart sounds.  Exam reveals no gallop and no friction rub.    No murmur heard.  Pulmonary/Chest: Effort normal and breath sounds normal. No respiratory distress. He has no wheezes. He has no rales. He exhibits no tenderness.   Abdominal: Soft. Bowel sounds are normal. He exhibits mass. He exhibits no distension. There is no tenderness.   Musculoskeletal: Normal range of motion. He exhibits no edema.   Lymphadenopathy:     He has no cervical adenopathy.   Neurological: He is alert and oriented to person, place, and time. He displays normal reflexes. No cranial nerve deficit.   Skin: Skin is dry. No rash noted. No erythema.   Psychiatric: He has a normal mood and affect.   Nursing note and vitals reviewed.      Recent Labs       11/05/18   0345  11/06/18   0337  11/07/18   0338   WBC  5.8  6.9  9.4   RBC  4.43*  4.44*  4.64*   HEMOGLOBIN  12.3*  12.4*  12.8*   HEMATOCRIT  38.2*  38.0*  39.3*   MCV  86.2  85.6  84.7   MCH  27.8  27.9  27.6   MCHC  32.2*  32.6*  32.6*   RDW  41.4  40.8  41.2   PLATELETCT  254  284  309   MPV  10.6  10.6  10.5     Recent Labs      11/05/18   0345  11/06/18   0337  11/07/18   0338   SODIUM  137  134*  133*   POTASSIUM  3.8  3.8  4.1   CHLORIDE  104  100  100   CO2  24  25  23   GLUCOSE  93  101*  110*   BUN  6*  10  9   CREATININE  0.79  0.91  0.89   CALCIUM  9.7  9.8  10.0     Recent Labs      11/05/18   0345   INR  1.07               CT-NEEDLE BX-ABD-RETROPERITONL   Final Result      CT-guided biopsy of anterior peritoneal nodule.      MR-BRAIN-WITH & W/O   Final Result      1.  Mild ventriculomegaly of the lateral ventricles and third ventricle. Developmental discontinuity of the septum pellucidum posteriorly.   2.  Mild supratentorial white matter disease most consistent with microvascular ischemic change.   3.  No evidence of acute infarction, hemorrhage, or enhancing mass lesion. No evidence of brain metastasis.      CT-HEAD W/O   Final Result         1.  There are nonspecific white matter changes, commonly associated with small vessel ischemic disease.  Associated mild cerebral atrophy is noted.   2.  Bilateral ventricular mild dilatation, could correspond with ex vacuo changes, consider component of hydrocephalus as clinically appropriate.      CT-CHEST (THORAX) W/O   Final Result         1.  Right upper lobe lobulated and spiculated pulmonary mass. Should be considered neoplastic unless proven otherwise.   2.  Left apical pulmonary nodule with spiculation, should be considered neoplastic unless proven otherwise.   3.  Abdominal masses along the peritoneum and omentum, appearance favors abdominal carcinomatosis.            OUTSIDE IMAGES-DX CHEST   Final Result      OUTSIDE IMAGES-CT ABDOMEN  /PELVIS   Final Result           Assessment/Plan     Partial small bowel obstruction (HCC)- (present on admission)   Assessment & Plan    Per surgery consultation, no small obstruction noted.  Recommend IR guided peritoneal biopsy for diagnosis of abdominal mass.  Had biopsy done yesterday, path report pending   Patient currently does have normal bowel function.     Abdominal mass- (present on admission)   Assessment & Plan    Concerning for metastatic disease  IR has been consulted for image guided biopsy  We will consider consulting oncology for further recommendations after path report  Pain control with oxycodone and IV morphine, monitor respiratory status closely  Monitor  CEA (+) but CA 19-9 (-)     HTN (hypertension)- (present on admission)   Assessment & Plan    IV hydralazine as needed for SBP greater than 160  stable     Class 1 obesity in adult- (present on admission)   Assessment & Plan    Body mass index is 31.16 kg/m².   Close monitor and weight reduction education provided       HLD (hyperlipidemia)- (present on admission)   Assessment & Plan    Continue gemfibrozil     Hypothyroid- (present on admission)   Assessment & Plan    Check TSH  Continue Synthroid       Quality-Core Measures   Reviewed items::  Labs reviewed, Medications reviewed and Radiology images reviewed  Lowe catheter::  No Lowe  DVT prophylaxis - mechanical:  SCDs  Ulcer Prophylaxis::  Not indicated     I have discussed with RN and CM and SW and other consultants about patient's plan.       Current Facility-Administered Medications:   •  enoxaparin (LOVENOX) inj 40 mg, 40 mg, Subcutaneous, DAILY, Jessica Sands M.D., Stopped at 11/06/18 0600  •  Respiratory Care per Protocol, , Nebulization, Continuous RT, Jonathan Matson M.D.  •  ondansetron (ZOFRAN) syringe/vial injection 4 mg, 4 mg, Intravenous, Q4HRS PRN, Jonathan Matson M.D.  •  ondansetron (ZOFRAN ODT) dispertab 4 mg, 4 mg, Oral, Q4HRS PRN, Jonathan Matson M.D.  •  promethazine (PHENERGAN)  tablet 12.5-25 mg, 12.5-25 mg, Oral, Q4HRS PRN, Jonathan Matson M.D.  •  promethazine (PHENERGAN) suppository 12.5-25 mg, 12.5-25 mg, Rectal, Q4HRS PRN, Jonathan Matson M.D.  •  prochlorperazine (COMPAZINE) injection 5-10 mg, 5-10 mg, Intravenous, Q4HRS PRN, Jonathan Matson M.D.  •  acetaminophen (TYLENOL) tablet 650 mg, 650 mg, Oral, Q6HRS PRN, Jonathan Matson M.D., 650 mg at 11/04/18 2150  •  Notify provider if pain remains uncontrolled, , , CONTINUOUS **AND** Use the numeric rating scale (NRS-11) on regular floors and Critical-Care Pain Observation Tool (CPOT) on ICUs/Trauma to assess pain, , , CONTINUOUS **AND** Pulse Ox (Oximetry), , , CONTINUOUS **AND** Pharmacy Consult Request ...Pain Management Review, , Other, PRN **AND** If patient difficult to arouse and/or has respiratory depression, stop any opiates that are currently infusing and call a Rapid Response., , , CONTINUOUS **AND** oxyCODONE immediate-release (ROXICODONE) tablet 5 mg, 5 mg, Oral, Q3HRS PRN, 5 mg at 11/05/18 1119 **AND** oxyCODONE immediate-release (ROXICODONE) tablet 10 mg, 10 mg, Oral, Q3HRS PRN, 10 mg at 11/07/18 1100 **AND** morphine (pf) 4 mg/ml injection 4 mg, 4 mg, Intravenous, Q3HRS PRN, Jonathan Matson M.D., 4 mg at 11/06/18 1543  •  hydrALAZINE (APRESOLINE) injection 20 mg, 20 mg, Intravenous, Q6HRS PRN, Jonathan Matson M.D.  •  gemfibrozil (LOPID) tablet 600 mg, 600 mg, Oral, BID, Jonathan Matson M.D., 600 mg at 11/07/18 0557  •  levothyroxine (SYNTHROID) tablet 50 mcg, 50 mcg, Oral, AM Jonathan MONTANEZ M.D., 50 mcg at 11/07/18 0557

## 2018-11-07 NOTE — CARE PLAN
Problem: Fluid Volume:  Goal: Will maintain balanced intake and output    Intervention: Monitor, educate, and encourage compliance with therapeutic intake of liquids  Pt is to be encouraged to increase oral intake of PO fluids      Problem: Psychosocial Needs:  Goal: Level of anxiety will decrease    Intervention: Identify and develop with patient strategies to cope with anxiety triggers  Pt can become agitated from staying in bed too long. Educated pt on the importance of ambulating the halls

## 2018-11-07 NOTE — PROGRESS NOTES
CT Procedure Note:    Patient arrived to CT4.  Patient A&Ox4 on room air and in no apparent distress.  Patient consented by Dr Nieves and all questions answered.  Dr. Nieves completed CT guided peritoneal mass biopsy.  Cores x 2 in formalin and 1 in RPMI collected from peritoneal mass and taken to lab by RT Nathan.  The patient tolerated the procedure well; ETCo2 range 23-29, with consistent waveform during the procedure.  Gauze and tegaderm applied to right abdomen access site, CDI; pressure held x 5 minutes.  Patient alert and oriented and verbally appropriate post procedure, vital signs stable, see flow sheet for vital signs.  Patient taken to T431-2 and bedside report given to KEYANA Ham and all questions answered.

## 2018-11-07 NOTE — PROGRESS NOTES
"Patient pain well controlled on PO medications.  Patient complaining of having lots of gas. Peppermint tea given for comfort, encouraged ambulation in hallway. MD orders reviewed, all questions answered. /84   Pulse 98   Temp 37.3 °C (99.2 °F)   Resp 16   Ht 1.778 m (5' 10\")   Wt 98.5 kg (217 lb 2.5 oz)   SpO2 95%   BMI 31.16 kg/m²     "

## 2018-11-07 NOTE — PROGRESS NOTES
Assumed care of patient who is AOx4. Pt has 0 out of 10 pain level, no nausea, or dizziness. Interventions for pain level can be seen on the MAR. Pt is tolerating Diabetic Soft Fiber GI diet adequately 100%.Pt is voiding in the urinal. Pt mobility status is independent. Pt is awaiting results from IR procedure. RN Reviewed plan of care with patient, bed in lowest position and locked, pt resting comfortably now, call light within reach, all needs met at this time

## 2018-11-08 LAB
ANION GAP SERPL CALC-SCNC: 10 MMOL/L (ref 0–11.9)
BUN SERPL-MCNC: 10 MG/DL (ref 8–22)
CALCIUM SERPL-MCNC: 10.2 MG/DL (ref 8.5–10.5)
CHLORIDE SERPL-SCNC: 100 MMOL/L (ref 96–112)
CO2 SERPL-SCNC: 24 MMOL/L (ref 20–33)
CREAT SERPL-MCNC: 0.88 MG/DL (ref 0.5–1.4)
ERYTHROCYTE [DISTWIDTH] IN BLOOD BY AUTOMATED COUNT: 42.5 FL (ref 35.9–50)
GLUCOSE SERPL-MCNC: 107 MG/DL (ref 65–99)
HCT VFR BLD AUTO: 39.4 % (ref 42–52)
HGB BLD-MCNC: 12.4 G/DL (ref 14–18)
MCH RBC QN AUTO: 27.4 PG (ref 27–33)
MCHC RBC AUTO-ENTMCNC: 31.5 G/DL (ref 33.7–35.3)
MCV RBC AUTO: 87 FL (ref 81.4–97.8)
PLATELET # BLD AUTO: 282 K/UL (ref 164–446)
PMV BLD AUTO: 10.8 FL (ref 9–12.9)
POTASSIUM SERPL-SCNC: 3.8 MMOL/L (ref 3.6–5.5)
RBC # BLD AUTO: 4.53 M/UL (ref 4.7–6.1)
SODIUM SERPL-SCNC: 134 MMOL/L (ref 135–145)
WBC # BLD AUTO: 8.7 K/UL (ref 4.8–10.8)

## 2018-11-08 PROCEDURE — 80048 BASIC METABOLIC PNL TOTAL CA: CPT

## 2018-11-08 PROCEDURE — 85027 COMPLETE CBC AUTOMATED: CPT

## 2018-11-08 PROCEDURE — 99232 SBSQ HOSP IP/OBS MODERATE 35: CPT | Performed by: INTERNAL MEDICINE

## 2018-11-08 PROCEDURE — 700102 HCHG RX REV CODE 250 W/ 637 OVERRIDE(OP): Performed by: INTERNAL MEDICINE

## 2018-11-08 PROCEDURE — 770001 HCHG ROOM/CARE - MED/SURG/GYN PRIV*

## 2018-11-08 PROCEDURE — 36415 COLL VENOUS BLD VENIPUNCTURE: CPT

## 2018-11-08 PROCEDURE — A9270 NON-COVERED ITEM OR SERVICE: HCPCS | Performed by: INTERNAL MEDICINE

## 2018-11-08 RX ADMIN — OXYCODONE HYDROCHLORIDE 10 MG: 10 TABLET ORAL at 17:18

## 2018-11-08 RX ADMIN — OXYCODONE HYDROCHLORIDE 10 MG: 10 TABLET ORAL at 20:19

## 2018-11-08 RX ADMIN — OXYCODONE HYDROCHLORIDE 10 MG: 10 TABLET ORAL at 12:22

## 2018-11-08 RX ADMIN — GEMFIBROZIL 600 MG: 600 TABLET ORAL at 05:06

## 2018-11-08 RX ADMIN — OXYCODONE HYDROCHLORIDE 10 MG: 10 TABLET ORAL at 03:10

## 2018-11-08 RX ADMIN — LEVOTHYROXINE SODIUM 50 MCG: 50 TABLET ORAL at 05:05

## 2018-11-08 RX ADMIN — GEMFIBROZIL 600 MG: 600 TABLET ORAL at 17:18

## 2018-11-08 RX ADMIN — OXYCODONE HYDROCHLORIDE 10 MG: 10 TABLET ORAL at 09:20

## 2018-11-08 RX ADMIN — PROMETHAZINE HYDROCHLORIDE 25 MG: 25 TABLET ORAL at 20:34

## 2018-11-08 ASSESSMENT — PAIN SCALES - GENERAL
PAINLEVEL_OUTOF10: 7
PAINLEVEL_OUTOF10: 3
PAINLEVEL_OUTOF10: 7
PAINLEVEL_OUTOF10: 7
PAINLEVEL_OUTOF10: 4

## 2018-11-08 ASSESSMENT — ENCOUNTER SYMPTOMS
WEAKNESS: 0
WEIGHT LOSS: 0
PALPITATIONS: 0
HEADACHES: 0
NAUSEA: 0
CHILLS: 0
WHEEZING: 0
HEARTBURN: 0
COUGH: 0
FEVER: 0
DEPRESSION: 0
BACK PAIN: 0
DIZZINESS: 0
EYE PAIN: 0
FALLS: 0
BLURRED VISION: 0
DIARRHEA: 0
VOMITING: 0
ABDOMINAL PAIN: 1
SEIZURES: 0
SHORTNESS OF BREATH: 0

## 2018-11-08 ASSESSMENT — LIFESTYLE VARIABLES: SUBSTANCE_ABUSE: 0

## 2018-11-08 NOTE — CARE PLAN
Problem: Pain Management  Goal: Pain level will decrease to patient's comfort goal    Intervention: Educate and implement non-pharmacologic comfort measures. Examples: relaxation, distration, play therapy, activity therapy, massage, etc.   11/07/18 1948   OTHER   Intervention Declines;Rest         Problem: Safety  Goal: Will remain free from injury    Intervention: Provide assistance with mobility   11/07/18 1948   OTHER   Assistance / Tolerance No assistance required

## 2018-11-08 NOTE — PROGRESS NOTES
Report received from NOC shift RN.   A/O X 4. Room air.   VSS. Labs reviewed.    BS normoactive X 4. +BM. +void.   PIV present on left AC, SL. Flushed and patent.   Patient is ambulating safely.   Call light at bedside.

## 2018-11-08 NOTE — PROGRESS NOTES
[facility]ist Progress Note    Date of Service: 11/8/2018    Chief Complaint  48 y.o. male admitted 11/3/2018 with complaint of abdominal pain.  Patient was noticed to have abdominal mass and partial small bowel obstruction initially.  Surgery was consulted and recommend IR guided biopsy to characterize abdominal mass.  Also recommend oncology follow-up if diagnosed with cancer.  Patient currently does not have small bowel obstruction.  Patient can tolerate diet.    Interval Problem Update  11/5.  Patient wants to eat.  Discontinue IV fluid because IR cannot perform biopsy today.  Will await for tomorrow. Patient's pain is local, 3-4/10, intermittent and does not radiate to other location, sharp and with some tingling. Can be controlled by pain meds.  Continue symptom management.  Other options including outpatient biopsy by IR.  11/6: Pt seen and examined, afebrile, no overnight events, no nausea or vomiting.  Plan for IR biopsy today.  11/7: No overnight events overnight, afebrile, no abdominal pain, had biopsy done yesterday, path report pending     11/8: pt Seen and examined, afebrile, no acute complains, pending path report, if that inconclusive might need a repeat biopsy     Consultants/Specialty  Sx  IR    Disposition  TBD        Review of Systems   Constitutional: Negative for chills, fever and weight loss.   HENT: Negative for congestion, ear pain and hearing loss.    Eyes: Negative for blurred vision and pain.   Respiratory: Negative for cough, shortness of breath and wheezing.    Cardiovascular: Negative for chest pain and palpitations.   Gastrointestinal: Positive for abdominal pain. Negative for diarrhea, heartburn, nausea and vomiting.   Genitourinary: Negative for dysuria, frequency and hematuria.   Musculoskeletal: Negative for back pain and falls.   Skin: Negative for itching and rash.   Neurological: Negative for dizziness, seizures, weakness and headaches.   Psychiatric/Behavioral: Negative  for depression, substance abuse and suicidal ideas.      Physical Exam  Laboratory/Imaging   Hemodynamics  Temp (24hrs), Av.5 °C (97.7 °F), Min:36.3 °C (97.4 °F), Max:36.8 °C (98.2 °F)   Temperature: 36.4 °C (97.5 °F)  Pulse  Av.9  Min: 76  Max: 103    Blood Pressure: 128/87      Respiratory      Respiration: 17, Pulse Oximetry: 95 %        RUL Breath Sounds: Clear, RML Breath Sounds: Clear, RLL Breath Sounds: Diminished, THERESA Breath Sounds: Diminished, LLL Breath Sounds: Diminished    Fluids    Intake/Output Summary (Last 24 hours) at 18 1057  Last data filed at 18 0400   Gross per 24 hour   Intake              360 ml   Output                0 ml   Net              360 ml       Nutrition  Orders Placed This Encounter   Procedures   • Diet Order Low Fiber(GI Soft)     Standing Status:   Standing     Number of Occurrences:   1     Order Specific Question:   Diet:     Answer:   Low Fiber(GI Soft) [2]     Physical Exam   Constitutional: He is oriented to person, place, and time. He appears well-developed and well-nourished.   HENT:   Head: Normocephalic.   Eyes: Pupils are equal, round, and reactive to light. EOM are normal.   Neck: Normal range of motion. Neck supple. No JVD present.   Cardiovascular: Normal rate, regular rhythm and normal heart sounds.  Exam reveals no gallop and no friction rub.    No murmur heard.  Pulmonary/Chest: Effort normal and breath sounds normal. No respiratory distress. He has no wheezes. He has no rales. He exhibits no tenderness.   Abdominal: Soft. Bowel sounds are normal. He exhibits mass. He exhibits no distension. There is no tenderness.   Musculoskeletal: Normal range of motion. He exhibits no edema.   Lymphadenopathy:     He has no cervical adenopathy.   Neurological: He is alert and oriented to person, place, and time. He displays normal reflexes. No cranial nerve deficit.   Skin: Skin is dry. No rash noted. No erythema.   Psychiatric: He has a normal mood and  affect.   Nursing note and vitals reviewed.      Recent Labs      11/06/18 0337 11/07/18   0338 11/08/18   0344   WBC  6.9  9.4  8.7   RBC  4.44*  4.64*  4.53*   HEMOGLOBIN  12.4*  12.8*  12.4*   HEMATOCRIT  38.0*  39.3*  39.4*   MCV  85.6  84.7  87.0   MCH  27.9  27.6  27.4   MCHC  32.6*  32.6*  31.5*   RDW  40.8  41.2  42.5   PLATELETCT  284  309  282   MPV  10.6  10.5  10.8     Recent Labs      11/06/18 0337 11/07/18 0338 11/08/18   0344   SODIUM  134*  133*  134*   POTASSIUM  3.8  4.1  3.8   CHLORIDE  100  100  100   CO2  25  23  24   GLUCOSE  101*  110*  107*   BUN  10  9  10   CREATININE  0.91  0.89  0.88   CALCIUM  9.8  10.0  10.2                   CT-NEEDLE BX-ABD-RETROPERITONL   Final Result      CT-guided biopsy of anterior peritoneal nodule.      MR-BRAIN-WITH & W/O   Final Result      1.  Mild ventriculomegaly of the lateral ventricles and third ventricle. Developmental discontinuity of the septum pellucidum posteriorly.   2.  Mild supratentorial white matter disease most consistent with microvascular ischemic change.   3.  No evidence of acute infarction, hemorrhage, or enhancing mass lesion. No evidence of brain metastasis.      CT-HEAD W/O   Final Result         1.  There are nonspecific white matter changes, commonly associated with small vessel ischemic disease.  Associated mild cerebral atrophy is noted.   2.  Bilateral ventricular mild dilatation, could correspond with ex vacuo changes, consider component of hydrocephalus as clinically appropriate.      CT-CHEST (THORAX) W/O   Final Result         1.  Right upper lobe lobulated and spiculated pulmonary mass. Should be considered neoplastic unless proven otherwise.   2.  Left apical pulmonary nodule with spiculation, should be considered neoplastic unless proven otherwise.   3.  Abdominal masses along the peritoneum and omentum, appearance favors abdominal carcinomatosis.            OUTSIDE IMAGES-DX CHEST   Final Result      OUTSIDE  IMAGES-CT ABDOMEN /PELVIS   Final Result           Assessment/Plan     Partial small bowel obstruction (HCC)- (present on admission)   Assessment & Plan    Per surgery consultation, no small obstruction noted.  Recommend IR guided peritoneal biopsy for diagnosis of abdominal mass.  Had biopsy done on 11/6 , path report pending   Patient currently does have normal bowel function.     Abdominal mass- (present on admission)   Assessment & Plan    Concerning for metastatic disease  IR has been consulted for image guided biopsy  We will consider consulting oncology for further recommendations after path report  Pain control with oxycodone and IV morphine, monitor respiratory status closely  Monitor  CEA (+) but CA 19-9 (-)     HTN (hypertension)- (present on admission)   Assessment & Plan    IV hydralazine as needed for SBP greater than 160  stable     Class 1 obesity in adult- (present on admission)   Assessment & Plan    Body mass index is 31.16 kg/m².   Close monitor and weight reduction education provided       HLD (hyperlipidemia)- (present on admission)   Assessment & Plan    Continue gemfibrozil     Hypothyroid- (present on admission)   Assessment & Plan    Check TSH  Continue Synthroid       Quality-Core Measures   Reviewed items::  Labs reviewed, Medications reviewed and Radiology images reviewed  Lowe catheter::  No Lowe  DVT prophylaxis - mechanical:  SCDs  Ulcer Prophylaxis::  Not indicated     I have discussed with RN and CM and SW and other consultants about patient's plan.       Current Facility-Administered Medications:   •  enoxaparin (LOVENOX) inj 40 mg, 40 mg, Subcutaneous, DAILY, Jessica Sands M.D., Stopped at 11/06/18 0600  •  Respiratory Care per Protocol, , Nebulization, Continuous RT, Jonathan Matson M.D.  •  ondansetron (ZOFRAN) syringe/vial injection 4 mg, 4 mg, Intravenous, Q4HRS PRN, Jonathan Matson M.D.  •  ondansetron (ZOFRAN ODT) dispertab 4 mg, 4 mg, Oral, Q4HRS PRN, Jonathan Matson M.D.  •   promethazine (PHENERGAN) tablet 12.5-25 mg, 12.5-25 mg, Oral, Q4HRS PRN, Jonathan Matson M.D.  •  promethazine (PHENERGAN) suppository 12.5-25 mg, 12.5-25 mg, Rectal, Q4HRS PRN, Jonathan Matson M.D.  •  prochlorperazine (COMPAZINE) injection 5-10 mg, 5-10 mg, Intravenous, Q4HRS PRN, Jonathan Matson M.D., 10 mg at 11/07/18 1637  •  acetaminophen (TYLENOL) tablet 650 mg, 650 mg, Oral, Q6HRS PRN, Jonathan Matson M.D., 650 mg at 11/04/18 2150  •  Notify provider if pain remains uncontrolled, , , CONTINUOUS **AND** Use the numeric rating scale (NRS-11) on regular floors and Critical-Care Pain Observation Tool (CPOT) on ICUs/Trauma to assess pain, , , CONTINUOUS **AND** Pulse Ox (Oximetry), , , CONTINUOUS **AND** Pharmacy Consult Request ...Pain Management Review, , Other, PRN **AND** If patient difficult to arouse and/or has respiratory depression, stop any opiates that are currently infusing and call a Rapid Response., , , CONTINUOUS **AND** oxyCODONE immediate-release (ROXICODONE) tablet 5 mg, 5 mg, Oral, Q3HRS PRN, 5 mg at 11/05/18 1119 **AND** oxyCODONE immediate-release (ROXICODONE) tablet 10 mg, 10 mg, Oral, Q3HRS PRN, 10 mg at 11/08/18 0920 **AND** morphine (pf) 4 mg/ml injection 4 mg, 4 mg, Intravenous, Q3HRS PRN, Jonathan Matson M.D., 4 mg at 11/06/18 1543  •  hydrALAZINE (APRESOLINE) injection 20 mg, 20 mg, Intravenous, Q6HRS PRN, Jonathan Matson M.D.  •  gemfibrozil (LOPID) tablet 600 mg, 600 mg, Oral, BID, Jonathan Matson M.D., 600 mg at 11/08/18 0504  •  levothyroxine (SYNTHROID) tablet 50 mcg, 50 mcg, Oral, AM ES, Jonathan Matson M.D., 50 mcg at 11/08/18 0502

## 2018-11-08 NOTE — PROGRESS NOTES
Assumed care of patient who is AOx4. Pt has 4 out of 10 pain level, no nausea, or dizziness. Pt did complain of hiccups during dayshift.  Pt is tolerating Diabetic Soft Fiber GI diet adequately 100%.Pt is voiding in the urinal. Pt mobility status is independent. Pt is awaiting results from IR procedure. RN Reviewed plan of care with patient, bed in lowest position and locked, pt resting comfortably now, call light within reach, all needs met at this time

## 2018-11-09 PROCEDURE — 770001 HCHG ROOM/CARE - MED/SURG/GYN PRIV*

## 2018-11-09 PROCEDURE — 700111 HCHG RX REV CODE 636 W/ 250 OVERRIDE (IP): Performed by: INTERNAL MEDICINE

## 2018-11-09 PROCEDURE — 700102 HCHG RX REV CODE 250 W/ 637 OVERRIDE(OP): Performed by: INTERNAL MEDICINE

## 2018-11-09 PROCEDURE — 99232 SBSQ HOSP IP/OBS MODERATE 35: CPT | Performed by: INTERNAL MEDICINE

## 2018-11-09 PROCEDURE — A9270 NON-COVERED ITEM OR SERVICE: HCPCS | Performed by: INTERNAL MEDICINE

## 2018-11-09 RX ADMIN — PROCHLORPERAZINE EDISYLATE 10 MG: 5 INJECTION INTRAMUSCULAR; INTRAVENOUS at 20:23

## 2018-11-09 RX ADMIN — OXYCODONE HYDROCHLORIDE 10 MG: 10 TABLET ORAL at 15:42

## 2018-11-09 RX ADMIN — GEMFIBROZIL 600 MG: 600 TABLET ORAL at 05:21

## 2018-11-09 RX ADMIN — OXYCODONE HYDROCHLORIDE 10 MG: 10 TABLET ORAL at 02:09

## 2018-11-09 RX ADMIN — OXYCODONE HYDROCHLORIDE 10 MG: 10 TABLET ORAL at 22:41

## 2018-11-09 RX ADMIN — ACETAMINOPHEN 650 MG: 325 TABLET, FILM COATED ORAL at 15:45

## 2018-11-09 RX ADMIN — OXYCODONE HYDROCHLORIDE 10 MG: 10 TABLET ORAL at 18:39

## 2018-11-09 RX ADMIN — OXYCODONE HYDROCHLORIDE 10 MG: 10 TABLET ORAL at 08:31

## 2018-11-09 RX ADMIN — OXYCODONE HYDROCHLORIDE 10 MG: 10 TABLET ORAL at 05:22

## 2018-11-09 RX ADMIN — LEVOTHYROXINE SODIUM 50 MCG: 50 TABLET ORAL at 05:21

## 2018-11-09 RX ADMIN — GEMFIBROZIL 600 MG: 600 TABLET ORAL at 18:39

## 2018-11-09 RX ADMIN — OXYCODONE HYDROCHLORIDE 10 MG: 10 TABLET ORAL at 12:01

## 2018-11-09 ASSESSMENT — ENCOUNTER SYMPTOMS
WEIGHT LOSS: 0
ABDOMINAL PAIN: 1
CHILLS: 0
BACK PAIN: 0
NAUSEA: 0
PALPITATIONS: 0
VOMITING: 0
SEIZURES: 0
WEAKNESS: 0
SHORTNESS OF BREATH: 0
FEVER: 0
EYE PAIN: 0
DIZZINESS: 0
WHEEZING: 0
COUGH: 0
DIARRHEA: 0
HEADACHES: 0
FALLS: 0
BLURRED VISION: 0
DEPRESSION: 0
HEARTBURN: 0

## 2018-11-09 ASSESSMENT — LIFESTYLE VARIABLES: SUBSTANCE_ABUSE: 0

## 2018-11-09 ASSESSMENT — PAIN SCALES - GENERAL
PAINLEVEL_OUTOF10: 7
PAINLEVEL_OUTOF10: 7
PAINLEVEL_OUTOF10: 6
PAINLEVEL_OUTOF10: 7
PAINLEVEL_OUTOF10: 4

## 2018-11-09 NOTE — PROGRESS NOTES
[facility]ist Progress Note    Date of Service: 11/9/2018    Chief Complaint  48 y.o. male admitted 11/3/2018 with complaint of abdominal pain.  Patient was noticed to have abdominal mass and partial small bowel obstruction initially.  Surgery was consulted and recommend IR guided biopsy to characterize abdominal mass.  Also recommend oncology follow-up if diagnosed with cancer.  Patient currently does not have small bowel obstruction.  Patient can tolerate diet.    Interval Problem Update  11/5.  Patient wants to eat.  Discontinue IV fluid because IR cannot perform biopsy today.  Will await for tomorrow. Patient's pain is local, 3-4/10, intermittent and does not radiate to other location, sharp and with some tingling. Can be controlled by pain meds.  Continue symptom management.  Other options including outpatient biopsy by IR.  11/6: Pt seen and examined, afebrile, no overnight events, no nausea or vomiting.  Plan for IR biopsy today.  11/7: No overnight events overnight, afebrile, no abdominal pain, had biopsy done yesterday, path report pending     11/8:Pt seen and examined, afebrile, no acute complains, pending path report, if that inconclusive might need a repeat biopsy      11/9: No overnight events, afebrile, pathology report came back showing poorly differentiated carcinoma  most consistent with metastatic primary lung adenocarcinoma.  Oncology Dr. Monroe has been consulted and will evaluate pt, appreciate rec.   Consultants/Specialty  Surgery   IR  Oncology     Disposition  TBD        Review of Systems   Constitutional: Negative for chills, fever and weight loss.   HENT: Negative for congestion, ear pain and hearing loss.    Eyes: Negative for blurred vision and pain.   Respiratory: Negative for cough, shortness of breath and wheezing.    Cardiovascular: Negative for chest pain and palpitations.   Gastrointestinal: Positive for abdominal pain. Negative for diarrhea, heartburn, nausea and vomiting.    Genitourinary: Negative for dysuria, frequency and hematuria.   Musculoskeletal: Negative for back pain and falls.   Skin: Negative for itching and rash.   Neurological: Negative for dizziness, seizures, weakness and headaches.   Psychiatric/Behavioral: Negative for depression, substance abuse and suicidal ideas.      Physical Exam  Laboratory/Imaging   Hemodynamics  Temp (24hrs), Av.9 °C (98.4 °F), Min:36.8 °C (98.2 °F), Max:37 °C (98.6 °F)   Temperature: 36.8 °C (98.2 °F)  Pulse  Av.3  Min: 76  Max: 103    Blood Pressure: 122/85      Respiratory      Respiration: 14, Pulse Oximetry: 95 %        RUL Breath Sounds: Clear, RML Breath Sounds: Clear, RLL Breath Sounds: Clear, THERESA Breath Sounds: Diminished, LLL Breath Sounds: Diminished    Fluids    Intake/Output Summary (Last 24 hours) at 18 1011  Last data filed at 18 0522   Gross per 24 hour   Intake              720 ml   Output                0 ml   Net              720 ml       Nutrition  Orders Placed This Encounter   Procedures   • Diet Order Low Fiber(GI Soft)     Standing Status:   Standing     Number of Occurrences:   1     Order Specific Question:   Diet:     Answer:   Low Fiber(GI Soft) [2]     Physical Exam   Constitutional: He is oriented to person, place, and time. He appears well-developed and well-nourished.   HENT:   Head: Normocephalic.   Eyes: Pupils are equal, round, and reactive to light. EOM are normal.   Neck: Normal range of motion. Neck supple. No JVD present.   Cardiovascular: Normal rate, regular rhythm and normal heart sounds.  Exam reveals no gallop and no friction rub.    No murmur heard.  Pulmonary/Chest: Effort normal and breath sounds normal. No respiratory distress. He has no wheezes. He has no rales. He exhibits no tenderness.   Abdominal: Soft. Bowel sounds are normal. He exhibits mass. He exhibits no distension. There is no tenderness.   Musculoskeletal: Normal range of motion. He exhibits no edema.    Lymphadenopathy:     He has no cervical adenopathy.   Neurological: He is alert and oriented to person, place, and time. He displays normal reflexes. No cranial nerve deficit.   Skin: Skin is dry. No rash noted. No erythema.   Psychiatric: He has a normal mood and affect.   Nursing note and vitals reviewed.      Recent Labs      11/07/18 0338 11/08/18   0344   WBC  9.4  8.7   RBC  4.64*  4.53*   HEMOGLOBIN  12.8*  12.4*   HEMATOCRIT  39.3*  39.4*   MCV  84.7  87.0   MCH  27.6  27.4   MCHC  32.6*  31.5*   RDW  41.2  42.5   PLATELETCT  309  282   MPV  10.5  10.8     Recent Labs      11/07/18 0338 11/08/18   0344   SODIUM  133*  134*   POTASSIUM  4.1  3.8   CHLORIDE  100  100   CO2  23  24   GLUCOSE  110*  107*   BUN  9  10   CREATININE  0.89  0.88   CALCIUM  10.0  10.2                   CT-NEEDLE BX-ABD-RETROPERITONL   Final Result      CT-guided biopsy of anterior peritoneal nodule.      MR-BRAIN-WITH & W/O   Final Result      1.  Mild ventriculomegaly of the lateral ventricles and third ventricle. Developmental discontinuity of the septum pellucidum posteriorly.   2.  Mild supratentorial white matter disease most consistent with microvascular ischemic change.   3.  No evidence of acute infarction, hemorrhage, or enhancing mass lesion. No evidence of brain metastasis.      CT-HEAD W/O   Final Result         1.  There are nonspecific white matter changes, commonly associated with small vessel ischemic disease.  Associated mild cerebral atrophy is noted.   2.  Bilateral ventricular mild dilatation, could correspond with ex vacuo changes, consider component of hydrocephalus as clinically appropriate.      CT-CHEST (THORAX) W/O   Final Result         1.  Right upper lobe lobulated and spiculated pulmonary mass. Should be considered neoplastic unless proven otherwise.   2.  Left apical pulmonary nodule with spiculation, should be considered neoplastic unless proven otherwise.   3.  Abdominal masses along the  peritoneum and omentum, appearance favors abdominal carcinomatosis.            OUTSIDE IMAGES-DX CHEST   Final Result      OUTSIDE IMAGES-CT ABDOMEN /PELVIS   Final Result           Assessment/Plan     Abdominal mass- (present on admission)   Assessment & Plan    Concerning for metastatic disease  IR has been consulted for image guided biopsy  We will consider consulting oncology for further recommendations after path report  Pain control with oxycodone and IV morphine, monitor respiratory status closely  Monitor  CEA (+) but CA 19-9 (-)  Path report came back showing poorly differentiated carcinoma  most consistent with metastatic primary lung adenocarcinoma.  Oncology Dr. Monroe has been  consulted and will evaluate pt, appreciate rec.        Partial small bowel obstruction (HCC)- (present on admission)   Assessment & Plan    Per surgery consultation, no small obstruction noted.  Recommend IR guided peritoneal biopsy for diagnosis of abdominal mass.  Had biopsy done on 11/6 , path report now showing adenocarcinoma of the lung as primary   Patient currently does have normal bowel function.  Oncology consulted      HTN (hypertension)- (present on admission)   Assessment & Plan    IV hydralazine as needed for SBP greater than 160  stable     Class 1 obesity in adult- (present on admission)   Assessment & Plan    Body mass index is 31.16 kg/m².   Close monitor and weight reduction education provided       HLD (hyperlipidemia)- (present on admission)   Assessment & Plan    Continue gemfibrozil     Hypothyroid- (present on admission)   Assessment & Plan    Check TSH  Continue Synthroid       Quality-Core Measures   Reviewed items::  Labs reviewed, Medications reviewed and Radiology images reviewed  Lowe catheter::  No Lowe  DVT prophylaxis - mechanical:  SCDs  Ulcer Prophylaxis::  Not indicated     I have discussed with RN and ZACHARY and SW and other consultants about patient's plan.       Current Facility-Administered  Medications:   •  enoxaparin (LOVENOX) inj 40 mg, 40 mg, Subcutaneous, DAILY, Jessica Sands M.D., Stopped at 11/06/18 0600  •  Respiratory Care per Protocol, , Nebulization, Continuous RT, Jonathan Matson M.D.  •  ondansetron (ZOFRAN) syringe/vial injection 4 mg, 4 mg, Intravenous, Q4HRS PRN, Jonathan Matson M.D.  •  ondansetron (ZOFRAN ODT) dispertab 4 mg, 4 mg, Oral, Q4HRS PRN, Jonathan Matson M.D.  •  promethazine (PHENERGAN) tablet 12.5-25 mg, 12.5-25 mg, Oral, Q4HRS PRN, Jonathan Matson M.D., 25 mg at 11/08/18 2034  •  promethazine (PHENERGAN) suppository 12.5-25 mg, 12.5-25 mg, Rectal, Q4HRS PRN, Jonathan Matson M.D.  •  prochlorperazine (COMPAZINE) injection 5-10 mg, 5-10 mg, Intravenous, Q4HRS PRN, Jonathan Matson M.D., 10 mg at 11/07/18 1637  •  acetaminophen (TYLENOL) tablet 650 mg, 650 mg, Oral, Q6HRS PRN, Jonathan Matson M.D., 650 mg at 11/04/18 2150  •  Notify provider if pain remains uncontrolled, , , CONTINUOUS **AND** Use the numeric rating scale (NRS-11) on regular floors and Critical-Care Pain Observation Tool (CPOT) on ICUs/Trauma to assess pain, , , CONTINUOUS **AND** Pulse Ox (Oximetry), , , CONTINUOUS **AND** Pharmacy Consult Request ...Pain Management Review, , Other, PRN **AND** If patient difficult to arouse and/or has respiratory depression, stop any opiates that are currently infusing and call a Rapid Response., , , CONTINUOUS **AND** oxyCODONE immediate-release (ROXICODONE) tablet 5 mg, 5 mg, Oral, Q3HRS PRN, 5 mg at 11/05/18 1119 **AND** oxyCODONE immediate-release (ROXICODONE) tablet 10 mg, 10 mg, Oral, Q3HRS PRN, 10 mg at 11/09/18 0831 **AND** morphine (pf) 4 mg/ml injection 4 mg, 4 mg, Intravenous, Q3HRS PRN, Jonathan Matson M.D., 4 mg at 11/06/18 1543  •  hydrALAZINE (APRESOLINE) injection 20 mg, 20 mg, Intravenous, Q6HRS PRN, Jonathan Matson M.D.  •  gemfibrozil (LOPID) tablet 600 mg, 600 mg, Oral, BID, Jonathan Matson M.D., 600 mg at 11/09/18 0521  •  levothyroxine (SYNTHROID) tablet 50 mcg, 50 mcg, Oral, AM ES,  Jonathan Matson M.D., 50 mcg at 11/09/18 0579

## 2018-11-09 NOTE — PROGRESS NOTES
Assumed care report received. Pt reporting 6/10 pain in abdomen and leg PRN Medication given. Plan of care for the day discussed with pt and wife at beside, awaiting for biopsy results. Questions answered. Fall precautions reviewed. Belongings and call light within reach.

## 2018-11-09 NOTE — CARE PLAN
Problem: Communication  Goal: The ability to communicate needs accurately and effectively will improve  Outcome: PROGRESSING AS EXPECTED  Plan of care discussed with patient and spouse at beginning of shift.     Problem: Safety  Goal: Will remain free from injury  Outcome: PROGRESSING AS EXPECTED  Bed in lowest position and locked. Call light within reach. Frequent rounding in place. Family at bedside.

## 2018-11-10 ENCOUNTER — APPOINTMENT (OUTPATIENT)
Dept: RADIOLOGY | Facility: MEDICAL CENTER | Age: 48
DRG: 375 | End: 2018-11-10
Attending: INTERNAL MEDICINE
Payer: MEDICAID

## 2018-11-10 PROCEDURE — 770001 HCHG ROOM/CARE - MED/SURG/GYN PRIV*

## 2018-11-10 PROCEDURE — 73702 CT LWR EXTREMITY W/O&W/DYE: CPT | Mod: RT

## 2018-11-10 PROCEDURE — 99232 SBSQ HOSP IP/OBS MODERATE 35: CPT | Performed by: INTERNAL MEDICINE

## 2018-11-10 PROCEDURE — A9270 NON-COVERED ITEM OR SERVICE: HCPCS | Performed by: INTERNAL MEDICINE

## 2018-11-10 PROCEDURE — 700102 HCHG RX REV CODE 250 W/ 637 OVERRIDE(OP): Performed by: INTERNAL MEDICINE

## 2018-11-10 PROCEDURE — 73502 X-RAY EXAM HIP UNI 2-3 VIEWS: CPT | Mod: RT

## 2018-11-10 PROCEDURE — 700111 HCHG RX REV CODE 636 W/ 250 OVERRIDE (IP): Performed by: INTERNAL MEDICINE

## 2018-11-10 PROCEDURE — 700117 HCHG RX CONTRAST REV CODE 255: Performed by: INTERNAL MEDICINE

## 2018-11-10 PROCEDURE — 74177 CT ABD & PELVIS W/CONTRAST: CPT

## 2018-11-10 RX ORDER — MORPHINE SULFATE 4 MG/ML
1-2 INJECTION, SOLUTION INTRAMUSCULAR; INTRAVENOUS
Status: DISCONTINUED | OUTPATIENT
Start: 2018-11-10 | End: 2018-11-10

## 2018-11-10 RX ORDER — OXYCODONE HYDROCHLORIDE 10 MG/1
10 TABLET ORAL
Status: DISCONTINUED | OUTPATIENT
Start: 2018-11-10 | End: 2018-11-10

## 2018-11-10 RX ORDER — OXYCODONE HYDROCHLORIDE 5 MG/1
5 TABLET ORAL
Status: DISCONTINUED | OUTPATIENT
Start: 2018-11-10 | End: 2018-11-10

## 2018-11-10 RX ORDER — OXYCODONE HYDROCHLORIDE 5 MG/1
15 TABLET ORAL
Status: DISCONTINUED | OUTPATIENT
Start: 2018-11-10 | End: 2018-11-12 | Stop reason: HOSPADM

## 2018-11-10 RX ORDER — OXYCODONE HYDROCHLORIDE 5 MG/1
10 TABLET ORAL
Status: DISCONTINUED | OUTPATIENT
Start: 2018-11-10 | End: 2018-11-12 | Stop reason: HOSPADM

## 2018-11-10 RX ADMIN — OXYCODONE HYDROCHLORIDE 10 MG: 10 TABLET ORAL at 09:27

## 2018-11-10 RX ADMIN — OXYCODONE HYDROCHLORIDE 5 MG: 5 TABLET ORAL at 02:58

## 2018-11-10 RX ADMIN — LEVOTHYROXINE SODIUM 50 MCG: 50 TABLET ORAL at 04:48

## 2018-11-10 RX ADMIN — GEMFIBROZIL 600 MG: 600 TABLET ORAL at 04:48

## 2018-11-10 RX ADMIN — IOHEXOL 100 ML: 350 INJECTION, SOLUTION INTRAVENOUS at 12:13

## 2018-11-10 RX ADMIN — IOHEXOL 20 ML: 350 INJECTION, SOLUTION INTRAVENOUS at 12:14

## 2018-11-10 RX ADMIN — OXYCODONE HYDROCHLORIDE 15 MG: 5 TABLET ORAL at 17:33

## 2018-11-10 RX ADMIN — GEMFIBROZIL 600 MG: 600 TABLET ORAL at 17:33

## 2018-11-10 RX ADMIN — OXYCODONE HYDROCHLORIDE 10 MG: 5 TABLET ORAL at 20:49

## 2018-11-10 RX ADMIN — OXYCODONE HYDROCHLORIDE 15 MG: 5 TABLET ORAL at 12:32

## 2018-11-10 RX ADMIN — PROCHLORPERAZINE EDISYLATE 10 MG: 5 INJECTION INTRAMUSCULAR; INTRAVENOUS at 20:49

## 2018-11-10 ASSESSMENT — ENCOUNTER SYMPTOMS
HEARTBURN: 0
VOMITING: 0
SHORTNESS OF BREATH: 0
BACK PAIN: 0
BLURRED VISION: 0
NAUSEA: 0
WEIGHT LOSS: 0
FALLS: 0
FEVER: 0
HEADACHES: 0
ABDOMINAL PAIN: 1
EYE PAIN: 0
SEIZURES: 0
DIARRHEA: 0
WEAKNESS: 0
CHILLS: 0
DEPRESSION: 0
WHEEZING: 0
COUGH: 0
PALPITATIONS: 0
DIZZINESS: 0

## 2018-11-10 ASSESSMENT — PAIN SCALES - GENERAL
PAINLEVEL_OUTOF10: 2
PAINLEVEL_OUTOF10: 4
PAINLEVEL_OUTOF10: 8
PAINLEVEL_OUTOF10: 4
PAINLEVEL_OUTOF10: 4
PAINLEVEL_OUTOF10: 2
PAINLEVEL_OUTOF10: 5
PAINLEVEL_OUTOF10: 4
PAINLEVEL_OUTOF10: 5
PAINLEVEL_OUTOF10: 4

## 2018-11-10 ASSESSMENT — LIFESTYLE VARIABLES: SUBSTANCE_ABUSE: 0

## 2018-11-10 NOTE — DISCHARGE PLANNING
Received Choice form at 1212  Agency/Facility Name: [facility]  Referral sent per Choice form @ [phone]

## 2018-11-10 NOTE — FACE TO FACE
Face to Face Note  -  Durable Medical Equipment    Willis Alarcon M.D. - NPI: [id number]  I certify that this patient is under my care and that they had a durable medical equipment(DME)face to face encounter by myself that meets the physician DME face-to-face encounter requirements with this patient on:    Date of encounter:   Patient:                    MRN:                       YOB: 2019  Santos Herriage  [id number]  1970     The encounter with the patient was in whole, or in part, for the following medical condition, which is the primary reason for durable medical equipment:  Other - lytic lesion of the righ hip with pain and dificulty walking     I certify that, based on my findings, the following durable medical equipment is medically necessary:  CANE    HOME O2 Saturation Measurements:(Values must be present for Home Oxygen orders)         ,     ,         My Clinical findings support the need for the above equipment due to:  Abnormal Gait

## 2018-11-10 NOTE — CARE PLAN
Problem: Safety  Goal: Will remain free from falls  Outcome: PROGRESSING AS EXPECTED  Pt is stand-by with a walker and does not exit the bed without staff present or his wife in the room.     Problem: Infection  Goal: Will remain free from infection  Outcome: PROGRESSING AS EXPECTED  Pt vital signs are stable and pt does not have an elevated temperature

## 2018-11-10 NOTE — PROGRESS NOTES
[facility] Hospitalist Progress Note    Date of Service: 11/10/2018    Chief Complaint  48 y.o. male admitted 11/3/2018 with complaint of abdominal pain.  Patient was noticed to have abdominal mass and partial small bowel obstruction initially.  Surgery was consulted and recommend IR guided biopsy to characterize abdominal mass.  Also recommend oncology follow-up if diagnosed with cancer.  Patient currently does not have small bowel obstruction.  Patient can tolerate diet.    Interval Problem Update  11/5.  Patient wants to eat.  Discontinue IV fluid because IR cannot perform biopsy today.  Will await for tomorrow. Patient's pain is local, 3-4/10, intermittent and does not radiate to other location, sharp and with some tingling. Can be controlled by pain meds.  Continue symptom management.  Other options including outpatient biopsy by IR.  11/6: Pt seen and examined, afebrile, no overnight events, no nausea or vomiting.  Plan for IR biopsy today.  11/7: No overnight events overnight, afebrile, no abdominal pain, had biopsy done yesterday, path report pending     11/8:Pt seen and examined, afebrile, no acute complains, pending path report, if that inconclusive might need a repeat biopsy      11/9: No overnight events, afebrile, pathology report came back showing poorly differentiated carcinoma  most consistent with metastatic primary lung adenocarcinoma.  Oncology Dr. Monroe has been consulted and will evaluate pt, appreciate rec.    11/10: pt seen and examined, no overnight events, afebrile, seen by oncology and a CT abdomen and pelvis also a bone scan ordered for staging, and also Ct of right hip to r/o fracture  as there is some mentioning of a right hip lesion. depending on the results of the ct of the right hip, if there is a fracture or high risk of fracture will consider consulting ortho.     Consultants/Specialty  Surgery   IR  Oncology     Disposition  TBD        Review of Systems   Constitutional: Negative for  chills, fever and weight loss.   HENT: Negative for congestion, ear pain and hearing loss.    Eyes: Negative for blurred vision and pain.   Respiratory: Negative for cough, shortness of breath and wheezing.    Cardiovascular: Negative for chest pain and palpitations.   Gastrointestinal: Positive for abdominal pain. Negative for diarrhea, heartburn, nausea and vomiting.   Genitourinary: Negative for dysuria, frequency and hematuria.   Musculoskeletal: Negative for back pain and falls.   Skin: Negative for itching and rash.   Neurological: Negative for dizziness, seizures, weakness and headaches.   Psychiatric/Behavioral: Negative for depression, substance abuse and suicidal ideas.      Physical Exam  Laboratory/Imaging   Hemodynamics  Temp (24hrs), Av.9 °C (98.4 °F), Min:36.7 °C (98.1 °F), Max:37.1 °C (98.7 °F)   Temperature: 36.8 °C (98.2 °F)  Pulse  Av.4  Min: 76  Max: 103    Blood Pressure: 145/102      Respiratory      Respiration: 15, Pulse Oximetry: 97 %        RUL Breath Sounds: Clear, RML Breath Sounds: Diminished, RLL Breath Sounds: Diminished, THERESA Breath Sounds: Diminished, LLL Breath Sounds: Diminished    Fluids    Intake/Output Summary (Last 24 hours) at 11/10/18 1234  Last data filed at 11/10/18 0400   Gross per 24 hour   Intake             1680 ml   Output                0 ml   Net             1680 ml       Nutrition  Orders Placed This Encounter   Procedures   • Diet Order Low Fiber(GI Soft)     Standing Status:   Standing     Number of Occurrences:   1     Order Specific Question:   Diet:     Answer:   Low Fiber(GI Soft) [2]     Physical Exam   Constitutional: He is oriented to person, place, and time. He appears well-developed and well-nourished.   HENT:   Head: Normocephalic.   Eyes: Pupils are equal, round, and reactive to light. EOM are normal.   Neck: Normal range of motion. Neck supple. No JVD present.   Cardiovascular: Normal rate, regular rhythm and normal heart sounds.  Exam reveals  no gallop and no friction rub.    No murmur heard.  Pulmonary/Chest: Effort normal and breath sounds normal. No respiratory distress. He has no wheezes. He has no rales. He exhibits no tenderness.   Abdominal: Soft. Bowel sounds are normal. He exhibits mass. He exhibits no distension. There is no tenderness.   Musculoskeletal: Normal range of motion. He exhibits no edema.   Lymphadenopathy:     He has no cervical adenopathy.   Neurological: He is alert and oriented to person, place, and time. He displays normal reflexes. No cranial nerve deficit.   Skin: Skin is dry. No rash noted. No erythema.   Psychiatric: He has a normal mood and affect.   Nursing note and vitals reviewed.      Recent Labs      11/08/18   0344   WBC  8.7   RBC  4.53*   HEMOGLOBIN  12.4*   HEMATOCRIT  39.4*   MCV  87.0   MCH  27.4   MCHC  31.5*   RDW  42.5   PLATELETCT  282   MPV  10.8     Recent Labs      11/08/18   0344   SODIUM  134*   POTASSIUM  3.8   CHLORIDE  100   CO2  24   GLUCOSE  107*   BUN  10   CREATININE  0.88   CALCIUM  10.2                   CT-NEEDLE BX-ABD-RETROPERITONL   Final Result      CT-guided biopsy of anterior peritoneal nodule.      MR-BRAIN-WITH & W/O   Final Result      1.  Mild ventriculomegaly of the lateral ventricles and third ventricle. Developmental discontinuity of the septum pellucidum posteriorly.   2.  Mild supratentorial white matter disease most consistent with microvascular ischemic change.   3.  No evidence of acute infarction, hemorrhage, or enhancing mass lesion. No evidence of brain metastasis.      CT-HEAD W/O   Final Result         1.  There are nonspecific white matter changes, commonly associated with small vessel ischemic disease.  Associated mild cerebral atrophy is noted.   2.  Bilateral ventricular mild dilatation, could correspond with ex vacuo changes, consider component of hydrocephalus as clinically appropriate.      CT-CHEST (THORAX) W/O   Final Result         1.  Right upper lobe  lobulated and spiculated pulmonary mass. Should be considered neoplastic unless proven otherwise.   2.  Left apical pulmonary nodule with spiculation, should be considered neoplastic unless proven otherwise.   3.  Abdominal masses along the peritoneum and omentum, appearance favors abdominal carcinomatosis.            OUTSIDE IMAGES-DX CHEST   Final Result      OUTSIDE IMAGES-CT ABDOMEN /PELVIS   Final Result      CT-ABDOMEN-PELVIS WITH    (Results Pending)   CT-HIP WITH & W/O RIGHT    (Results Pending)   DX-HIP-COMPLETE - UNILATERAL 2+ RIGHT    (Results Pending)   NM-BONE SCAN WHOLE BODY    (Results Pending)        Assessment/Plan     Abdominal mass- (present on admission)   Assessment & Plan    Concerning for metastatic disease  IR has been consulted for image guided biopsy  We will consider consulting oncology for further recommendations after path report  Pain control with oxycodone and IV morphine, monitor respiratory status closely  Monitor  CEA (+) but CA 19-9 (-)  Path report came back showing poorly differentiated carcinoma  most consistent with metastatic primary lung adenocarcinoma.  Oncology Dr. Monroe has been  consulted and will evaluate pt, appreciate rec.        Partial small bowel obstruction (HCC)- (present on admission)   Assessment & Plan    Per surgery consultation, no small obstruction noted.  Recommend IR guided peritoneal biopsy for diagnosis of abdominal mass.  Had biopsy done on 11/6 , path report now showing adenocarcinoma of the lung as primary   Patient currently does have normal bowel function.  Oncology consulted      HTN (hypertension)- (present on admission)   Assessment & Plan    IV hydralazine as needed for SBP greater than 160  stable     Class 1 obesity in adult- (present on admission)   Assessment & Plan    Body mass index is 31.16 kg/m².   Close monitor and weight reduction education provided       HLD (hyperlipidemia)- (present on admission)   Assessment & Plan    Continue  gemfibrozil     Hypothyroid- (present on admission)   Assessment & Plan    Check TSH  Continue Synthroid       Quality-Core Measures   Reviewed items::  Labs reviewed, Medications reviewed and Radiology images reviewed  Lowe catheter::  No Lowe  DVT prophylaxis - mechanical:  SCDs  Ulcer Prophylaxis::  Not indicated     I have discussed with RN and CM and SW and other consultants about patient's plan.       Current Facility-Administered Medications:   •  Notify provider if pain remains uncontrolled, , , CONTINUOUS **AND** Use the numeric rating scale (NRS-11) on regular floors and Critical-Care Pain Observation Tool (CPOT) on ICUs/Trauma to assess pain, , , CONTINUOUS **AND** Pulse Ox (Oximetry), , , CONTINUOUS **AND** Pharmacy Consult Request ...Pain Management Review, , Other, PRN **AND** If patient difficult to arouse and/or has respiratory depression, stop any opiates that are currently infusing and call a Rapid Response., , , CONTINUOUS **AND** oxyCODONE immediate-release (ROXICODONE) tablet 10 mg, 10 mg, Oral, Q3HRS PRN **AND** oxyCODONE immediate-release (ROXICODONE) tablet 15 mg, 15 mg, Oral, Q3HRS PRN, 15 mg at 11/10/18 1232 **AND** [DISCONTINUED] morphine (pf) 4 mg/ml injection 1-2 mg, 1-2 mg, Intravenous, Q3HRS PRN, Willis Alarcon M.D.  •  enoxaparin (LOVENOX) inj 40 mg, 40 mg, Subcutaneous, DAILY, Jessica Sands M.D., Stopped at 11/06/18 0600  •  Respiratory Care per Protocol, , Nebulization, Continuous RT, Jonathan Matson M.D.  •  ondansetron (ZOFRAN) syringe/vial injection 4 mg, 4 mg, Intravenous, Q4HRS PRN, Jonathan Matson M.D.  •  ondansetron (ZOFRAN ODT) dispertab 4 mg, 4 mg, Oral, Q4HRS PRN, Jonathan Matson M.D.  •  promethazine (PHENERGAN) tablet 12.5-25 mg, 12.5-25 mg, Oral, Q4HRS PRN, Jonathan Matson M.D., 25 mg at 11/08/18 2034  •  promethazine (PHENERGAN) suppository 12.5-25 mg, 12.5-25 mg, Rectal, Q4HRS PRN, Jonathan Matson M.D.  •  prochlorperazine (COMPAZINE) injection 5-10 mg, 5-10 mg, Intravenous, Q4HRS PRN,  Jonathan Matson M.D., 10 mg at 11/09/18 2023  •  acetaminophen (TYLENOL) tablet 650 mg, 650 mg, Oral, Q6HRS PRN, Jonathan Matson M.D., 650 mg at 11/09/18 1545  •  hydrALAZINE (APRESOLINE) injection 20 mg, 20 mg, Intravenous, Q6HRS PRN, Jonathan Matson M.D.  •  gemfibrozil (LOPID) tablet 600 mg, 600 mg, Oral, BID, Jonathan Matson M.D., 600 mg at 11/10/18 6228  •  levothyroxine (SYNTHROID) tablet 50 mcg, 50 mcg, Oral, AM ES, Jonathan Matson M.D., 50 mcg at 11/10/18 0448

## 2018-11-10 NOTE — PROGRESS NOTES
"Pt laying in bed, call light within reach, bed lowered and locked, fall education reinforced. Pt is A&Ox4 and on room air. Pt lung sounds are clear in all lobes, bowel sounds are normoactive x4, heart sounds are within defined limits. Pt skin is intact with a stab site on his abdomen from the abdominal mass biopsy. Pt is tolerating a low fiber GI diet and reports anxiety about being discharged reporting that he has \"things to do at home\".  "

## 2018-11-10 NOTE — CONSULTS
DATE OF SERVICE:  11/10/2018    INPATIENT ONCOLOGY CONSULTATION    REASON FOR CONSULT:  New diagnosis of metastatic lung cancer.    HISTORY OF PRESENT ILLNESS:  The patient is a very nice 48-year-old male with   history of hypertension, hyperlipidemia, hypothyroidism, cataracts, and eczema   who has now been found to have metastatic non-small cell lung cancer.  I have   been asked to consult on the case.    The records indicate he presented to the emergency room at [facility] on   11/04, with complaints of 3 weeks of abdominal pain as well as some nausea,   but no vomiting.  This has been gradually worsening.  He has also had some   chronic right hip pain.  He underwent some imaging there, which sounds like   was a CT scan of the abdomen, which showed some abdominal masses as well as   worrisome features for a partial SBO.  There are some comments about the   possibility of a right hip bony lesion.    Because of these findings, he was transferred to [facility].  He underwent a CT   scan of the chest, which showed a 2.6 cm right upper lobe spiculated mass, a   2.1 cm left upper lobe spiculated mass, as well as multiple abdominal large   soft tissue masses along the peritoneum and the omentum, and the visualized   portions of the abdomen on the CT scan.  He has had a CT scan of the head that   showed no acute changes.  He underwent an MRI of the brain, which showed no   metastatic lesions, stroke or any acute changes.    On 11/06/2018 he underwent a CT-guided biopsy of an abdominal/peritoneal mass.    This returned showing a poorly-differentiated carcinoma.  The IHC staining   pattern was consistent with metastatic lung cancer, non-small cell lung cancer   as a primary (positive for TTF-1, Napsin A, AE1/AE3, CK7; negative for PSA,   CK20, CK5/6, CDX2, p63, GATA3).  Because of this new diagnosis, I have been   asked to consult on the case.    PAST MEDICAL HISTORY:  1.  Hypertension.  2.  Hyperlipidemia.  3.   Hypothyroidism.  4.  Cataract.  5.  Eczema.    PAST SURGICAL HISTORY:  Cervical spine laminectomy and fusion on 2018.    ALLERGIES:  No known drug allergies.    MEDICATIONS:  Here in the hospital:    1.  Lovenox 40 mg subQ daily.  2.  Gemfibrozil 600 mg daily.  3.  Synthroid 50 mcg daily.  4.  Tylenol p.r.n.  5.  Zofran p.r.n.  6.  Phenergan p.r.n.  7.  Oxycodone p.r.n.    FAMILY HISTORY:  His father had a history of leukemia.  He had a paternal aunt   who  of lung cancer.  He has another paternal aunt with history of colon   cancer.  He has another paternal aunt who had a cancer of some type, but he is   not sure what kind.    SOCIAL HISTORY:  He was born in [place].  He was in [place].  He is    and his wife's name is Moni.  He has 3 children.  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  He smoked for 25 years of 1 pack per day, but quit 2 years ago.    He now uses nicotine vaping.  He does not drink any alcohol.  He denies any IV   drug use or illicit drug use.    REVIEW OF SYSTEMS:  Review of systems is positive for illness listed above.    He also has some right chronic vision loss, possibly related to cataracts.  He   has had trouble with heartburn as well as abdominal pain off and on.  He is   now having diarrhea.  He has pain in the right hip.  He has eczema off and on.    He has some occasional mild numbness in the hands off and on since his neck   surgery.  Otherwise, a complete review of systems per the AMA and CMS criteria   was negative.    PHYSICAL EXAMINATION:  VITAL SIGNS:  His height is 5 feet 10 inches, his weight 97 kilograms.  His   T-max is 98.7, pulse of 94, blood pressure 126/88, respirations 18, satting   93% on room air.  GENERAL:  No acute distress.  Pleasant gentleman, appears stated age.  HEENT:  NCAT, sclerae are anicteric.  Conjunctivae clear.  Oropharynx is clear   without any erythema, exudate or  discharge.  NECK:  Supple, nontender, no JVP, no carotid bruits, no thyromegaly.  CHEST:  Clear to auscultation and percussion bilaterally.  No wheeze, rales,   or rhonchi.  CARDIOVASCULAR:  Regular rate and rhythm.  No murmurs, gallops or rubs.    Normal S1 and S2.  ABDOMEN:  Soft, nontender.  His liver is mildly enlarged.  Obese.  No   appreciable masses.  He has normoactive bowel sounds.  No guarding or rebound.  LYMPH NODES:  No cervical, supraclavicular, infraclavicular, axillary or   inguinal lymphadenopathy.  EXTREMITIES:  No cyanosis, clubbing or edema.  Full range of motion.  No joint   swelling.  SKIN:  No rashes, bruising, petechiae, ulcerations, or nonhealing wounds.  NEUROLOGIC:  His cranial nerves II-XII are intact.  He has intact sensation to   light touch throughout.  He moves all 4 extremities appropriately.  He is   alert and oriented x3.    LABORATORY DATA:  White blood count 8.7, hemoglobin 12.4, hematocrit 39.4%,   platelets 282, MCV 87.  Sodium 134, potassium 3.8, chloride 100, CO2 of 24,   BUN 10, creatinine 0.88, glucose 107, calcium 10.2.  CEA 3.5, CA 19-9 5.2.    ASSESSMENT AND PLAN:  The patient is a very nice 48-year-old man with a   history of hypertension, hyperlipidemia, hypothyroidism, cataracts, and eczema   who has now been diagnosed with metastatic poorly differentiated carcinoma of   the lung (likely nonsmall cell lung cancer, positive for TTF-1, and CK7,   negative for CK20, CDX2, p63, GATA3, PSA).  A CT scan of the chest showed a   2.6 cm right upper lobe spiculated mass, and a 2.1 cm left upper lobe   spiculated mass.  Outside imaging suggests multiple abdominal masses.  A   peritoneal CT-guided biopsy on 11/06 made the diagnosis.  I have been asked to   consult on the case.    I spent some time talking to the patient's wife about his disease.  I   explained to her that this appears to be consistent with lung cancer.  As   such, this would be stage IV metastatic disease and  would represent an   incurable disease.  Even though it is incurable, there are treatment options   which can help palliate symptoms as well as give her more time.    We talked about treatment options including chemotherapy, molecularly targeted   therapy, as well as immunotherapies.  We would need to send his tumor out for   foundation 1 testing to see if he would be a candidate for targeted therapies   or the immunotherapies.    We talked about chemotherapy.  Typically, this is given through an IV usually   on every 3-week schedule.  This can be associated with side effects which can   include but are not limited to hair loss, nausea, vomiting, weakness, fatigue,   loss of appetite, neuropathy, as well as lowering the blood counts with the   risk of infection, and the rare risk of death from infection.  Chemotherapy   can have significant side effects and we have to monitor closely for   toxicities.    We talked about molecularly targeted therapies.  If he was found to have a   targetable mutation such as EGFR, ALK, ROS-1, or BRAF V600E, then he may be a   candidate for targeted therapy.  These are typically oral medications.  These   have much less side effects compared to chemotherapy.  They can have some   other own side effects which often can include diarrhea, mild rashes,   electrolyte abnormalities, and mild blood count changes.    We will also test his tumor to see the PD-L1 status.  Based on this, he may be   a candidate for immunotherapy.  We talked about immunotherapies.  These are   typically given through an IV.  These medications stimulate the patient's   immune system to fight against the cancer.  They have much less side effects   compared to chemotherapy as well.  About 90% of people who take these have   minimal to no side effects.  Some of the minimal side effects could include   mild rashes, mild diarrhea, mild achiness.  About 10% of patient can have more   significant side effects.  These can  include autoimmune colitis, hepatitis,   nephritis, or endocrinopathies.    At this point, we will have to wait until Monday to have the pathology office   send out his tumor for FoundationOne testing.  It will take 2 weeks for that   to come back.  He does not need to be in the hospital await for these results.    However, at this point, I would like to complete staging.  It would be   worthwhile doing a CT scan of the abdomen and pelvis as well as a CT scan   through the right hip to evaluate the extent of his disease.  I would also   like to do a bone scan.  We will also do Plain x-rays of the right hip.  The   worry here is that he may have a significant destructive lesion in the right   hip.  If there is a lesion that is larger or is causing cortical thinning,   then there may be a high risk for impending pathologic fracture.  If that is   the case, we may want him to see orthopedic service while he is here and   consider a prophylactic nailing procedure to prevent fracture.  If there is   minimal risk for fracture, but there is a bony lesion, then consulting   radiation oncology would make a lot of sense as well to get them involved to   help prevent progression in this sensitive area.  We will see what the work   shows and then make further suggestions from there.  If there is no   significant bony lesion, then perhaps he will be a candidate to discharge home   soon and he can follow up with us as an outpatient.    Thank you very much for referral of this nice gentleman.       ____________________________________     MD RAFAEL CAMILO / GODWIN    DD:  11/10/2018 09:11:43  DT:  11/10/2018 09:57:32    D#:  [id number]  Job#:  [id number]

## 2018-11-10 NOTE — PROGRESS NOTES
Assumed care of patient from night shift RN  48 year old male  Full code  Allergies to PCN  Admitted 11/03  Pain 4/10 will medicate per MAR  A&O x 4  RA  Last BM 11/08, low fiber, GI soft  Voiding appropriately   20 g L hand, SL  abd incision with CDI dressing  Standby assist with cane  No fall risk per meri hodges  Plan: Oncology to see patient today  All questions answered and all needs met at this time. Patient educated on fall prevention and use of call light. Patient verbalized understanding. Bed in low locked position. Call light within reach. RN will implement hourly rounding and answer call lights appropriately.

## 2018-11-10 NOTE — CARE PLAN
Problem: Knowledge Deficit  Goal: Knowledge of disease process/condition, treatment plan, diagnostic tests, and medications will improve  Outcome: PROGRESSING AS EXPECTED  Pt reports understanding of plan of care  Goal: Knowledge of the prescribed therapeutic regimen will improve  Outcome: PROGRESSING AS EXPECTED  Pt reports understanding of plan of care

## 2018-11-10 NOTE — DISCHARGE PLANNING
Anticipated Discharge Disposition: Home    Action: LSW met w/ pt at bedside and explained DME Choice. Pt signed choice and LSW faxed to [facility] ([id number]).     Barriers to Discharge: none    Plan: Pt to dc home when cane delivered to bedside.

## 2018-11-11 LAB
ALBUMIN SERPL BCP-MCNC: 3.8 G/DL (ref 3.2–4.9)
ALBUMIN/GLOB SERPL: 1.1 G/DL
ALP SERPL-CCNC: 143 U/L (ref 30–99)
ALT SERPL-CCNC: 29 U/L (ref 2–50)
ANION GAP SERPL CALC-SCNC: 10 MMOL/L (ref 0–11.9)
AST SERPL-CCNC: 25 U/L (ref 12–45)
BILIRUB SERPL-MCNC: 0.4 MG/DL (ref 0.1–1.5)
BUN SERPL-MCNC: 14 MG/DL (ref 8–22)
CALCIUM SERPL-MCNC: 10.2 MG/DL (ref 8.5–10.5)
CHLORIDE SERPL-SCNC: 100 MMOL/L (ref 96–112)
CO2 SERPL-SCNC: 25 MMOL/L (ref 20–33)
CREAT SERPL-MCNC: 0.9 MG/DL (ref 0.5–1.4)
ERYTHROCYTE [DISTWIDTH] IN BLOOD BY AUTOMATED COUNT: 42.5 FL (ref 35.9–50)
GLOBULIN SER CALC-MCNC: 3.6 G/DL (ref 1.9–3.5)
GLUCOSE SERPL-MCNC: 110 MG/DL (ref 65–99)
HCT VFR BLD AUTO: 37.7 % (ref 42–52)
HGB BLD-MCNC: 12.6 G/DL (ref 14–18)
LDH SERPL L TO P-CCNC: 275 U/L (ref 107–266)
MCH RBC QN AUTO: 28.8 PG (ref 27–33)
MCHC RBC AUTO-ENTMCNC: 33.4 G/DL (ref 33.7–35.3)
MCV RBC AUTO: 86.1 FL (ref 81.4–97.8)
PLATELET # BLD AUTO: 349 K/UL (ref 164–446)
PMV BLD AUTO: 10.4 FL (ref 9–12.9)
POTASSIUM SERPL-SCNC: 4 MMOL/L (ref 3.6–5.5)
PROT SERPL-MCNC: 7.4 G/DL (ref 6–8.2)
RBC # BLD AUTO: 4.38 M/UL (ref 4.7–6.1)
SODIUM SERPL-SCNC: 135 MMOL/L (ref 135–145)
WBC # BLD AUTO: 7.6 K/UL (ref 4.8–10.8)

## 2018-11-11 PROCEDURE — 700102 HCHG RX REV CODE 250 W/ 637 OVERRIDE(OP): Performed by: INTERNAL MEDICINE

## 2018-11-11 PROCEDURE — 83615 LACTATE (LD) (LDH) ENZYME: CPT

## 2018-11-11 PROCEDURE — 85027 COMPLETE CBC AUTOMATED: CPT

## 2018-11-11 PROCEDURE — A9270 NON-COVERED ITEM OR SERVICE: HCPCS | Performed by: INTERNAL MEDICINE

## 2018-11-11 PROCEDURE — 36415 COLL VENOUS BLD VENIPUNCTURE: CPT

## 2018-11-11 PROCEDURE — 700111 HCHG RX REV CODE 636 W/ 250 OVERRIDE (IP): Performed by: INTERNAL MEDICINE

## 2018-11-11 PROCEDURE — 99232 SBSQ HOSP IP/OBS MODERATE 35: CPT | Performed by: INTERNAL MEDICINE

## 2018-11-11 PROCEDURE — 770001 HCHG ROOM/CARE - MED/SURG/GYN PRIV*

## 2018-11-11 PROCEDURE — 80053 COMPREHEN METABOLIC PANEL: CPT

## 2018-11-11 RX ORDER — GABAPENTIN 300 MG/1
300 CAPSULE ORAL 3 TIMES DAILY
Status: DISCONTINUED | OUTPATIENT
Start: 2018-11-11 | End: 2018-11-12 | Stop reason: HOSPADM

## 2018-11-11 RX ADMIN — LEVOTHYROXINE SODIUM 50 MCG: 50 TABLET ORAL at 04:30

## 2018-11-11 RX ADMIN — GABAPENTIN 300 MG: 300 CAPSULE ORAL at 10:26

## 2018-11-11 RX ADMIN — OXYCODONE HYDROCHLORIDE 15 MG: 5 TABLET ORAL at 08:27

## 2018-11-11 RX ADMIN — PROCHLORPERAZINE EDISYLATE 10 MG: 5 INJECTION INTRAMUSCULAR; INTRAVENOUS at 21:40

## 2018-11-11 RX ADMIN — OXYCODONE HYDROCHLORIDE 15 MG: 5 TABLET ORAL at 11:47

## 2018-11-11 RX ADMIN — GEMFIBROZIL 600 MG: 600 TABLET ORAL at 04:30

## 2018-11-11 RX ADMIN — GEMFIBROZIL 600 MG: 600 TABLET ORAL at 16:59

## 2018-11-11 RX ADMIN — OXYCODONE HYDROCHLORIDE 15 MG: 5 TABLET ORAL at 19:07

## 2018-11-11 RX ADMIN — OXYCODONE HYDROCHLORIDE 10 MG: 5 TABLET ORAL at 02:57

## 2018-11-11 RX ADMIN — GABAPENTIN 300 MG: 300 CAPSULE ORAL at 16:59

## 2018-11-11 RX ADMIN — OXYCODONE HYDROCHLORIDE 15 MG: 5 TABLET ORAL at 15:13

## 2018-11-11 ASSESSMENT — ENCOUNTER SYMPTOMS
ORTHOPNEA: 0
DIZZINESS: 0
DEPRESSION: 0
EYE PAIN: 0
BACK PAIN: 0
FEVER: 0
SPUTUM PRODUCTION: 0
VOMITING: 0
HEADACHES: 0
HEMOPTYSIS: 0
FALLS: 0
SEIZURES: 0
DIARRHEA: 0
WEIGHT LOSS: 0
SHORTNESS OF BREATH: 0
PHOTOPHOBIA: 0
DOUBLE VISION: 0
NAUSEA: 0
ABDOMINAL PAIN: 1
BLURRED VISION: 0
COUGH: 0
WEAKNESS: 0
CHILLS: 0
PALPITATIONS: 0
WHEEZING: 0
HEARTBURN: 0
SINUS PAIN: 0

## 2018-11-11 ASSESSMENT — PAIN SCALES - GENERAL
PAINLEVEL_OUTOF10: 5
PAINLEVEL_OUTOF10: 3
PAINLEVEL_OUTOF10: 2
PAINLEVEL_OUTOF10: 5
PAINLEVEL_OUTOF10: 4
PAINLEVEL_OUTOF10: 6
PAINLEVEL_OUTOF10: 7
PAINLEVEL_OUTOF10: 4
PAINLEVEL_OUTOF10: 4

## 2018-11-11 ASSESSMENT — LIFESTYLE VARIABLES: SUBSTANCE_ABUSE: 0

## 2018-11-11 NOTE — PROGRESS NOTES
[facility] Hospitalist Progress Note    Date of Service: 11/11/2018    Chief Complaint  48 y.o. male admitted 11/3/2018 with complaint of abdominal pain.  Patient was noticed to have abdominal mass and partial small bowel obstruction initially.  Surgery was consulted and recommend IR guided biopsy to characterize abdominal mass.  Also recommend oncology follow-up if diagnosed with cancer.  Patient currently does not have small bowel obstruction.  Patient can tolerate diet.    Interval Problem Update  11/5.  Patient wants to eat.  Discontinue IV fluid because IR cannot perform biopsy today.  Will await for tomorrow. Patient's pain is local, 3-4/10, intermittent and does not radiate to other location, sharp and with some tingling. Can be controlled by pain meds.  Continue symptom management.  Other options including outpatient biopsy by IR.  11/6: Pt seen and examined, afebrile, no overnight events, no nausea or vomiting.  Plan for IR biopsy today.  11/7: No overnight events overnight, afebrile, no abdominal pain, had biopsy done yesterday, path report pending     11/8:Pt seen and examined, afebrile, no acute complains, pending path report, if that inconclusive might need a repeat biopsy      11/9: No overnight events, afebrile, pathology report came back showing poorly differentiated carcinoma  most consistent with metastatic primary lung adenocarcinoma.  Oncology Dr. Monroe has been consulted and will evaluate pt, appreciate rec.    11/10: pt seen and examined, no overnight events, afebrile, seen by oncology and a CT abdomen and pelvis also a bone scan ordered for staging, and also Ct of right hip to r/o fracture  as there is some mentioning of a right hip lesion. depending on the results of the ct of the right hip, if there is a fracture or high risk of fracture will consider consulting ortho.   11/11: no overnight events, CT hip did not show any fracture, ahd CT abdomen for staging, Bone scan still pending  Oncology  following appreciate rec.     Consultants/Specialty  Surgery   IR  Oncology     Disposition  TBD        Review of Systems   Constitutional: Negative for chills, fever and weight loss.   HENT: Negative for congestion, ear pain and hearing loss.    Eyes: Negative for blurred vision and pain.   Respiratory: Negative for cough, shortness of breath and wheezing.    Cardiovascular: Negative for chest pain and palpitations.   Gastrointestinal: Positive for abdominal pain. Negative for diarrhea, heartburn, nausea and vomiting.   Genitourinary: Negative for dysuria, frequency and hematuria.   Musculoskeletal: Negative for back pain and falls.   Skin: Negative for itching and rash.   Neurological: Negative for dizziness, seizures, weakness and headaches.   Psychiatric/Behavioral: Negative for depression, substance abuse and suicidal ideas.      Physical Exam  Laboratory/Imaging   Hemodynamics  Temp (24hrs), Av.7 °C (98.1 °F), Min:36.4 °C (97.6 °F), Max:36.9 °C (98.4 °F)   Temperature: 36.4 °C (97.6 °F)  Pulse  Av.4  Min: 76  Max: 103    Blood Pressure: 129/90      Respiratory      Respiration: 18, Pulse Oximetry: 100 %        RUL Breath Sounds: Clear, RML Breath Sounds: Diminished, RLL Breath Sounds: Diminished, THERESA Breath Sounds: Diminished, LLL Breath Sounds: Diminished    Fluids    Intake/Output Summary (Last 24 hours) at 18 1118  Last data filed at 18 0348   Gross per 24 hour   Intake             1200 ml   Output                0 ml   Net             1200 ml       Nutrition  Orders Placed This Encounter   Procedures   • Diet Order Low Fiber(GI Soft)     Standing Status:   Standing     Number of Occurrences:   1     Order Specific Question:   Diet:     Answer:   Low Fiber(GI Soft) [2]     Physical Exam   Constitutional: He is oriented to person, place, and time. He appears well-developed and well-nourished.   HENT:   Head: Normocephalic.   Eyes: Pupils are equal, round, and reactive to light. EOM are  normal.   Neck: Normal range of motion. Neck supple. No JVD present.   Cardiovascular: Normal rate, regular rhythm and normal heart sounds.  Exam reveals no gallop and no friction rub.    No murmur heard.  Pulmonary/Chest: Effort normal and breath sounds normal. No respiratory distress. He has no wheezes. He has no rales. He exhibits no tenderness.   Abdominal: Soft. Bowel sounds are normal. He exhibits mass. He exhibits no distension. There is no tenderness.   Musculoskeletal: Normal range of motion. He exhibits no edema.   Lymphadenopathy:     He has no cervical adenopathy.   Neurological: He is alert and oriented to person, place, and time. He displays normal reflexes. No cranial nerve deficit.   Skin: Skin is dry. No rash noted. No erythema.   Psychiatric: He has a normal mood and affect.   Nursing note and vitals reviewed.      Recent Labs      11/11/18   0352   WBC  7.6   RBC  4.38*   HEMOGLOBIN  12.6*   HEMATOCRIT  37.7*   MCV  86.1   MCH  28.8   MCHC  33.4*   RDW  42.5   PLATELETCT  349   MPV  10.4     Recent Labs      11/11/18   0352   SODIUM  135   POTASSIUM  4.0   CHLORIDE  100   CO2  25   GLUCOSE  110*   BUN  14   CREATININE  0.90   CALCIUM  10.2                   DX-HIP-COMPLETE - UNILATERAL 2+ RIGHT   Final Result      1.  No evidence of fracture.      2.  Minimal early degenerative change of the right hip.      3.  Appearance of the proximal femurs bilaterally, right greater than left which has been described in the clinical setting of femoral acetabular impingement.      CT-ABDOMEN-PELVIS WITH   Final Result      1.  Extensive omental, peritoneal, and mesenteric carcinomatosis      2.  Subcutaneous mass in the anterior left lower quadrant abdominal wall measuring 3.0 x 2.4 cm consistent with metastasis      3.  Lytic mass originating from the right ilium, extending into the right-sided sacrum, and the adjacent gluteal musculature measuring at least 5.5 x 4.4 cm and representing a bony metastasis       4.  No other significant finding      CT-HIP WITH & W/O RIGHT   Final Result      1.  Extensive omental, peritoneal, and mesenteric carcinomatosis      2.  Subcutaneous mass in the anterior left lower quadrant abdominal wall measuring 3.0 x 2.4 cm consistent with metastasis      3.  Lytic mass originating from the right ilium, extending into the right-sided sacrum, and the adjacent gluteal musculature measuring at least 5.5 x 4.4 cm and representing a bony metastasis      4.  No other significant finding      CT-NEEDLE BX-ABD-RETROPERITONL   Final Result      CT-guided biopsy of anterior peritoneal nodule.      MR-BRAIN-WITH & W/O   Final Result      1.  Mild ventriculomegaly of the lateral ventricles and third ventricle. Developmental discontinuity of the septum pellucidum posteriorly.   2.  Mild supratentorial white matter disease most consistent with microvascular ischemic change.   3.  No evidence of acute infarction, hemorrhage, or enhancing mass lesion. No evidence of brain metastasis.      CT-HEAD W/O   Final Result         1.  There are nonspecific white matter changes, commonly associated with small vessel ischemic disease.  Associated mild cerebral atrophy is noted.   2.  Bilateral ventricular mild dilatation, could correspond with ex vacuo changes, consider component of hydrocephalus as clinically appropriate.      CT-CHEST (THORAX) W/O   Final Result         1.  Right upper lobe lobulated and spiculated pulmonary mass. Should be considered neoplastic unless proven otherwise.   2.  Left apical pulmonary nodule with spiculation, should be considered neoplastic unless proven otherwise.   3.  Abdominal masses along the peritoneum and omentum, appearance favors abdominal carcinomatosis.            OUTSIDE IMAGES-DX CHEST   Final Result      OUTSIDE IMAGES-CT ABDOMEN /PELVIS   Final Result      NM-BONE SCAN WHOLE BODY    (Results Pending)        Assessment/Plan     Abdominal mass- (present on admission)    Assessment & Plan    Concerning for metastatic disease  IR has been consulted for image guided biopsy  We will consider consulting oncology for further recommendations after path report  Pain control with oxycodone and IV morphine, monitor respiratory status closely  Monitor  CEA (+) but CA 19-9 (-)  Path report came back showing poorly differentiated carcinoma  most consistent with metastatic primary lung adenocarcinoma.  Oncology Dr. Monroe has been  consulted and will evaluate pt, appreciate rec.        Partial small bowel obstruction (HCC)- (present on admission)   Assessment & Plan    Per surgery consultation, no small obstruction noted.  Recommend IR guided peritoneal biopsy for diagnosis of abdominal mass.  Had biopsy done on 11/6 , path report now showing adenocarcinoma of the lung as primary   Patient currently does have normal bowel function.  Oncology consulted      HTN (hypertension)- (present on admission)   Assessment & Plan    IV hydralazine as needed for SBP greater than 160  stable     Class 1 obesity in adult- (present on admission)   Assessment & Plan    Body mass index is 31.16 kg/m².   Close monitor and weight reduction education provided       HLD (hyperlipidemia)- (present on admission)   Assessment & Plan    Continue gemfibrozil     Hypothyroid- (present on admission)   Assessment & Plan    Check TSH  Continue Synthroid       Quality-Core Measures   Reviewed items::  Labs reviewed, Medications reviewed and Radiology images reviewed  Lowe catheter::  No Lowe  DVT prophylaxis - mechanical:  SCDs  Ulcer Prophylaxis::  Not indicated     I have discussed with RN and ZACHARY and SW and other consultants about patient's plan.       Current Facility-Administered Medications:   •  gabapentin (NEURONTIN) capsule 300 mg, 300 mg, Oral, TID, Willis Alarcon M.D., 300 mg at 11/11/18 1026  •  Notify provider if pain remains uncontrolled, , , CONTINUOUS **AND** Use the numeric rating scale (NRS-11) on regular  floors and Critical-Care Pain Observation Tool (CPOT) on ICUs/Trauma to assess pain, , , CONTINUOUS **AND** Pulse Ox (Oximetry), , , CONTINUOUS **AND** Pharmacy Consult Request ...Pain Management Review, , Other, PRN **AND** If patient difficult to arouse and/or has respiratory depression, stop any opiates that are currently infusing and call a Rapid Response., , , CONTINUOUS **AND** oxyCODONE immediate-release (ROXICODONE) tablet 10 mg, 10 mg, Oral, Q3HRS PRN, 10 mg at 11/11/18 0257 **AND** oxyCODONE immediate-release (ROXICODONE) tablet 15 mg, 15 mg, Oral, Q3HRS PRN, 15 mg at 11/11/18 0827 **AND** [DISCONTINUED] morphine (pf) 4 mg/ml injection 1-2 mg, 1-2 mg, Intravenous, Q3HRS PRN, Wlilis Alarcon M.D.  •  enoxaparin (LOVENOX) inj 40 mg, 40 mg, Subcutaneous, DAILY, Jessica Sands M.D., Stopped at 11/06/18 0600  •  Respiratory Care per Protocol, , Nebulization, Continuous RT, Jonathan Matson M.D.  •  ondansetron (ZOFRAN) syringe/vial injection 4 mg, 4 mg, Intravenous, Q4HRS PRN, Jonathan Matson M.D.  •  ondansetron (ZOFRAN ODT) dispertab 4 mg, 4 mg, Oral, Q4HRS PRN, Jonathan Matson M.D.  •  promethazine (PHENERGAN) tablet 12.5-25 mg, 12.5-25 mg, Oral, Q4HRS PRN, Jonathan Matson M.D., 25 mg at 11/08/18 2034  •  promethazine (PHENERGAN) suppository 12.5-25 mg, 12.5-25 mg, Rectal, Q4HRS PRN, Jonathan Matson M.D.  •  prochlorperazine (COMPAZINE) injection 5-10 mg, 5-10 mg, Intravenous, Q4HRS PRN, Jonathan Matson M.D., 10 mg at 11/10/18 2049  •  acetaminophen (TYLENOL) tablet 650 mg, 650 mg, Oral, Q6HRS PRN, Jonathan Matson M.D., 650 mg at 11/09/18 1545  •  hydrALAZINE (APRESOLINE) injection 20 mg, 20 mg, Intravenous, Q6HRS PRN, Jonathan Matson M.D.  •  gemfibrozil (LOPID) tablet 600 mg, 600 mg, Oral, BID, Jonathan Matson M.D., 600 mg at 11/11/18 0430  •  levothyroxine (SYNTHROID) tablet 50 mcg, 50 mcg, Oral, AM ES, Jonathan Matson M.D., 50 mcg at 11/11/18 0430

## 2018-11-11 NOTE — CARE PLAN
Problem: Bowel/Gastric:  Goal: Will not experience complications related to bowel motility  Outcome: PROGRESSING AS EXPECTED  Pt has been having regular bowel movements and does not report constipation or difficulty passing stool    Problem: Respiratory:  Goal: Respiratory status will improve  Outcome: PROGRESSING AS EXPECTED  Pt continues to be on room air and has adequate oxygen saturation

## 2018-11-11 NOTE — PROGRESS NOTES
Pt laying in bed, call light within reach, bed lowered and locked., fall education reinforced. Pt lung sounds are diminished at the bases, bowel sounds are normoactive in all four quadrants, heart sounds are within defined limits. Pt ambulates with a quad support cane that he borrowed from his friend. Pt has a prescription in place and is to have a cane delivered in the morning. Plan is for the patient to be discharged in the morning following results of his CT scan and will have the metastasis taken care of outpatient. Pt is A&Ox4 and on room air. Wife is in the room with the pt. Pt reports mild pain in the right hip, prn pain medications given.

## 2018-11-11 NOTE — PROGRESS NOTES
Assumed care of patient from night shift RN  48 year old male  Full code  Allergies to PCN  Admitted 11/03  Pain 6/10 will medicate per MAR  A&O x 4  RA  Last BM 11/08, low fiber, GI soft  Voiding appropriately   20 g L hand, SL  abd incision with CDI dressing  Standby assist with cane  No fall risk per meri hodges  Plan: bone scan  All questions answered and all needs met at this time. Patient educated on fall prevention and use of call light. Patient verbalized understanding. Bed in low locked position. Call light within reach. RN will implement hourly rounding and answer call lights appropriately.

## 2018-11-12 ENCOUNTER — APPOINTMENT (OUTPATIENT)
Dept: RADIOLOGY | Facility: MEDICAL CENTER | Age: 48
DRG: 375 | End: 2018-11-12
Attending: INTERNAL MEDICINE
Payer: MEDICAID

## 2018-11-12 VITALS
OXYGEN SATURATION: 95 % | RESPIRATION RATE: 18 BRPM | SYSTOLIC BLOOD PRESSURE: 125 MMHG | HEART RATE: 96 BPM | BODY MASS INDEX: 31.4 KG/M2 | DIASTOLIC BLOOD PRESSURE: 89 MMHG | WEIGHT: 219.36 LBS | HEIGHT: 70 IN | TEMPERATURE: 98.6 F

## 2018-11-12 PROCEDURE — A9503 TC99M MEDRONATE: HCPCS

## 2018-11-12 PROCEDURE — 700102 HCHG RX REV CODE 250 W/ 637 OVERRIDE(OP): Performed by: INTERNAL MEDICINE

## 2018-11-12 PROCEDURE — A9270 NON-COVERED ITEM OR SERVICE: HCPCS | Performed by: INTERNAL MEDICINE

## 2018-11-12 PROCEDURE — 99239 HOSP IP/OBS DSCHRG MGMT >30: CPT | Performed by: INTERNAL MEDICINE

## 2018-11-12 RX ORDER — OXYCODONE HYDROCHLORIDE 15 MG/1
15 TABLET ORAL EVERY 8 HOURS PRN
Qty: 9 TAB | Refills: 0 | Status: SHIPPED | OUTPATIENT
Start: 2018-11-12 | End: 2018-11-12

## 2018-11-12 RX ORDER — OXYCODONE HYDROCHLORIDE 15 MG/1
15 TABLET ORAL EVERY 8 HOURS PRN
Qty: 9 TAB | Refills: 0 | Status: SHIPPED | OUTPATIENT
Start: 2018-11-12 | End: 2018-11-15

## 2018-11-12 RX ORDER — GABAPENTIN 300 MG/1
300 CAPSULE ORAL 3 TIMES DAILY
Qty: 90 CAP | Refills: 0 | Status: SHIPPED | OUTPATIENT
Start: 2018-11-12

## 2018-11-12 RX ORDER — ASPIRIN 325 MG
325 TABLET ORAL ONCE
Status: COMPLETED | OUTPATIENT
Start: 2018-11-12 | End: 2018-11-12

## 2018-11-12 RX ORDER — GABAPENTIN 300 MG/1
300 CAPSULE ORAL 3 TIMES DAILY
Qty: 90 CAP | Refills: 0 | Status: SHIPPED | OUTPATIENT
Start: 2018-11-12 | End: 2018-11-12

## 2018-11-12 RX ADMIN — OXYCODONE HYDROCHLORIDE 15 MG: 5 TABLET ORAL at 08:14

## 2018-11-12 RX ADMIN — OXYCODONE HYDROCHLORIDE 15 MG: 5 TABLET ORAL at 12:41

## 2018-11-12 RX ADMIN — GABAPENTIN 300 MG: 300 CAPSULE ORAL at 12:41

## 2018-11-12 RX ADMIN — GEMFIBROZIL 600 MG: 600 TABLET ORAL at 05:10

## 2018-11-12 RX ADMIN — ASPIRIN 325 MG: 325 TABLET, COATED ORAL at 13:37

## 2018-11-12 RX ADMIN — LEVOTHYROXINE SODIUM 50 MCG: 50 TABLET ORAL at 05:10

## 2018-11-12 RX ADMIN — OXYCODONE HYDROCHLORIDE 15 MG: 5 TABLET ORAL at 04:00

## 2018-11-12 RX ADMIN — GABAPENTIN 300 MG: 300 CAPSULE ORAL at 05:10

## 2018-11-12 ASSESSMENT — PAIN SCALES - GENERAL
PAINLEVEL_OUTOF10: 4
PAINLEVEL_OUTOF10: 3
PAINLEVEL_OUTOF10: 7

## 2018-11-12 NOTE — DISCHARGE SUMMARY
Discharge Summary    CHIEF COMPLAINT ON ADMISSION  Chief Complaint   Patient presents with   • Abdominal Pain       Reason for Admission  EMS 23     Admission Date  11/3/2018    CODE STATUS  Full Code    HPI & HOSPITAL COURSE  This is a 48 y.o. male who presented 11/3/2018 with abdominal pain for the past 3 weeks.  He presented to an outlying facility and was found to have abdominal mass on imaging concerning for cancer and was transferred to [facility] for further evaluation.  He presented to an outlying facility and was found to have abdominal mass on imaging concerning for cancer and was transferred to [facility] for further evaluation. Pt was also found to have partial small bowel obstruction, so general surgery was consulted, but  No need for any intervention. Pt is having normal BM, and no nausea or vomiting, and tolerating diet well.   Regarding his abdominal carcinomatosis, pt had an IR biopsy of the mass. He also had a CT chest which showed an upper lobe spiculated mas concerning for malignancy.   The results from the biopsy showed poorly differentiated carcinoma with primary  Been lung adenocarcinoma. Oncology was consulted and evaluated the pt. He had another CT abdomen here for staging and also bone scan, . He also had a CT of the hip to r/o fracture and was negative.   Per oncology stand point pt can be discharged and will follow up with them to discuss about treatment in oncology clinic.  Pt will be discharged home today      Therefore, he is discharged in guarded and stable condition to home with close outpatient follow-up.    The patient met 2-midnight criteria for an inpatient stay at the time of discharge.    Discharge Date  11/12/18    FOLLOW UP ITEMS POST DISCHARGE  Oncology     DISCHARGE DIAGNOSES  Active Problems:    Partial small bowel obstruction (HCC) POA: Yes    Abdominal mass POA: Yes  Lung adenocarcinoma stage 4    HTN (hypertension) POA: Yes    Hypothyroid POA: Yes    HLD (hyperlipidemia) POA:  Yes    Class 1 obesity in adult POA: Yes        FOLLOW UP  No future appointments.  Polo Monroe M.D.  [address] [facility] [id number] [phone]    Schedule an appointment as soon as possible for a visit  for scan follow-up      MEDICATIONS ON DISCHARGE     Medication List      START taking these medications      Instructions   oxycodone 15 MG immediate release tablet  Commonly known as:  OXY-IR   Take 1 Tab by mouth every 8 hours as needed for up to 3 days.  Dose:  15 mg        CONTINUE taking these medications      Instructions   gabapentin 300 MG Caps  Commonly known as:  NEURONTIN   Take 1 Cap by mouth 3 times a day.  Dose:  300 mg     gemfibrozil 600 MG Tabs  Commonly known as:  LOPID   Take 600 mg by mouth 2 times a day.  Dose:  600 mg     levothyroxine 50 MCG Tabs  Commonly known as:  SYNTHROID   Take 50 mcg by mouth Every morning on an empty stomach.  Dose:  50 mcg     vitamin D 1000 UNIT Tabs  Commonly known as:  cholecalciferol   Take 2,000 Units by mouth every day.  Dose:  2000 Units            Allergies  Allergies   Allergen Reactions   • Pcn [Penicillins] Unspecified     Mother stated he had a reaction as a baby       DIET  Orders Placed This Encounter   Procedures   • Diet Order Low Fiber(GI Soft)     Standing Status:   Standing     Number of Occurrences:   1     Order Specific Question:   Diet:     Answer:   Low Fiber(GI Soft) [2]       ACTIVITY  As tolerated.  Weight bearing as tolerated    CONSULTATIONS  Oncology     PROCEDURES  IR biopsy of the abdominal mass     LABORATORY  Lab Results   Component Value Date    SODIUM 135 11/11/2018    POTASSIUM 4.0 11/11/2018    CHLORIDE 100 11/11/2018    CO2 25 11/11/2018    GLUCOSE 110 (H) 11/11/2018    BUN 14 11/11/2018    CREATININE 0.90 11/11/2018        Lab Results   Component Value Date    WBC 7.6 11/11/2018    HEMOGLOBIN 12.6 (L) 11/11/2018    HEMATOCRIT 37.7 (L) 11/11/2018    PLATELETCT 349 11/11/2018        Total time of the discharge process  exceeds 41 minutes.

## 2018-11-12 NOTE — DISCHARGE INSTRUCTIONS
Discharge Instructions    Discharged to home by car with relative. Discharged via wheelchair, hospital escort: Yes.  Special equipment needed: Not Applicable    Be sure to schedule a follow-up appointment with your primary care doctor or any specialists as instructed.     Discharge Plan:   Diet Plan: Discussed  Activity Level: Discussed  Smoking Cessation Offered: Patient Refused  Confirmed Follow up Appointment: Patient to Call and Schedule Appointment  Confirmed Symptoms Management: Discussed  Medication Reconciliation Updated: Yes  Pneumococcal Vaccine Administered/Refused: Not given - Patient refused pneumococcal vaccine  Influenza Vaccine Indication: Patient Refuses    I understand that a diet low in cholesterol, fat, and sodium is recommended for good health. Unless I have been given specific instructions below for another diet, I accept this instruction as my diet prescription.   Other diet: GI soft, as tolerated    Special Instructions: None    · Is patient discharged on Warfarin / Coumadin?   No     Depression / Suicide Risk    As you are discharged from this [facility] facility, it is important to learn how to keep safe from harming yourself.    Recognize the warning signs:  · Abrupt changes in personality, positive or negative- including increase in energy   · Giving away possessions  · Change in eating patterns- significant weight changes-  positive or negative  · Change in sleeping patterns- unable to sleep or sleeping all the time   · Unwillingness or inability to communicate  · Depression  · Unusual sadness, discouragement and loneliness  · Talk of wanting to die  · Neglect of personal appearance   · Rebelliousness- reckless behavior  · Withdrawal from people/activities they love  · Confusion- inability to concentrate     If you or a loved one observes any of these behaviors or has concerns about self-harm, here's what you can do:  · Talk about it- your feelings and reasons for harming  yourself  · Remove any means that you might use to hurt yourself (examples: pills, rope, extension cords, firearm)  · Get professional help from the community (Mental Health, Substance Abuse, psychological counseling)  · Do not be alone:Call your Safe Contact- someone whom you trust who will be there for you.  · Call your local CRISIS HOTLINE 774-1115 or 748-651-9668  · Call your local Children's Mobile Crisis Response Team [place] (157) 988-1061 or www.[employer]  · Call the toll free National Suicide Prevention Hotlines   · National Suicide Prevention Lifeline 974-641-QTRH (7218)  · National Hope Line Network 800-SUICIDE (254-9682)

## 2018-11-12 NOTE — CARE PLAN
Problem: Venous Thromboembolism (VTW)/Deep Vein Thrombosis (DVT) Prevention:  Goal: Patient will participate in Venous Thrombosis (VTE)/Deep Vein Thrombosis (DVT)Prevention Measures  Outcome: PROGRESSING AS EXPECTED  Pt ambulates frequently and is up self to the bathroom with his wife    Problem: Psychosocial Needs:  Goal: Level of anxiety will decrease  Outcome: PROGRESSING AS EXPECTED  Pt reports anxiety regarding discharge and delays. Pt updated on current plans of care as they are available.

## 2018-11-12 NOTE — PROGRESS NOTES
"Bedside report received. Assessment complete.  A&O x 4. Patient calls appropriately.  Patient up with cane assist.  Patient has 3/10 pain in abdomen. Previously medicated, see MAR.  Denies N&V. Tolerating GI soft diet.  Surgical incision to umbilicus, CDI.  + void, + flatus  Patient denies SOB.  Patient resting quietly in bed with wife at bedside. Refusing AM lab draw stating \"I don't see the point, I'm going home after this scan today.\"  Reviewed plan of care with patient. Call light and personal belongings within reach. Hourly rounding in place. All needs met at this time.  "

## 2018-11-12 NOTE — PROGRESS NOTES
Patient discharge summary reviewed with patient and signed, with prescription given. All questions answered. PIV removed and patient tolerated well. Hospital escort to pharmacy to  medications.

## 2018-11-12 NOTE — PROGRESS NOTES
Oncology/Hematology Progress Note               Author: Polo Monroe    Cc: Metastatic lung cancer Date & Time created: 11/11/2018  6:01 PM     Interval History:  He is doing okay.  His nausea has resolved, and he is eating a full regular diet.  He is not having any more diarrhea.  He does still have some abdominal pain off and on as well as pain in the right sacral back/hip area.  Gabapentin was started and this is helping the pain significantly.      PMHx, PSurgHX, SocHX, FAMHx:   All reviewed and no changes    Review of Systems:  Review of Systems   Constitutional: Negative for chills, fever and malaise/fatigue.   HENT: Negative for congestion, nosebleeds and sinus pain.    Eyes: Negative for blurred vision, double vision and photophobia.   Respiratory: Negative for cough, hemoptysis, sputum production and shortness of breath.    Cardiovascular: Negative for chest pain, palpitations, orthopnea and leg swelling.   Gastrointestinal: Positive for abdominal pain. Negative for diarrhea, heartburn, nausea and vomiting.   Genitourinary: Negative for dysuria, frequency and urgency.   Skin: Negative for itching and rash.       Physical Exam:  Physical Exam   Constitutional: He is oriented to person, place, and time. He appears well-developed and well-nourished. No distress.   HENT:   Head: Normocephalic and atraumatic.   Mouth/Throat: Oropharynx is clear and moist. No oropharyngeal exudate.   Eyes: Conjunctivae and EOM are normal. Right eye exhibits no discharge. Left eye exhibits no discharge. No scleral icterus.   Neck: Normal range of motion. Neck supple.   Cardiovascular: Normal rate, regular rhythm and normal heart sounds.  Exam reveals no gallop and no friction rub.    No murmur heard.  Pulmonary/Chest: Effort normal and breath sounds normal. No respiratory distress. He has no wheezes. He has no rales.   Abdominal: Soft. Bowel sounds are normal. He exhibits no distension. There is no tenderness. There is no  rebound and no guarding.   Musculoskeletal: Normal range of motion. He exhibits no edema or tenderness.   Lymphadenopathy:     He has no cervical adenopathy.   Neurological: He is alert and oriented to person, place, and time.   Skin: Skin is warm and dry. No rash noted. He is not diaphoretic. No erythema.   Psychiatric: He has a normal mood and affect. His behavior is normal. Thought content normal.       Labs:          Recent Labs      18   SODIUM  135   POTASSIUM  4.0   CHLORIDE  100   CO2  25   BUN  14   CREATININE  0.90   CALCIUM  10.2     Recent Labs      18   ALTSGPT  29   ASTSGOT  25   ALKPHOSPHAT  143*   TBILIRUBIN  0.4   GLUCOSE  110*     Recent Labs      18   RBC  4.38*   HEMOGLOBIN  12.6*   HEMATOCRIT  37.7*   PLATELETCT  349     Recent Labs      18   WBC  7.6   ASTSGOT  25   ALTSGPT  29   ALKPHOSPHAT  143*   TBILIRUBIN  0.4     Recent Labs      18   SODIUM  135   POTASSIUM  4.0   CHLORIDE  100   CO2  25   GLUCOSE  110*   BUN  14   CREATININE  0.90   CALCIUM  10.2     Hemodynamics:  Temp (24hrs), Av.7 °C (98.1 °F), Min:36.4 °C (97.6 °F), Max:36.9 °C (98.5 °F)  Temperature: 36.9 °C (98.5 °F)  Pulse  Av.3  Min: 76  Max: 103   Blood Pressure: 133/86     Respiratory:    Respiration: 17, Pulse Oximetry: 98 %        RUL Breath Sounds: Clear, RML Breath Sounds: Diminished, RLL Breath Sounds: Diminished, THERESA Breath Sounds: Diminished, LLL Breath Sounds: Diminished  Fluids:    Intake/Output Summary (Last 24 hours) at 18 1801  Last data filed at 18 1330   Gross per 24 hour   Intake             1920 ml   Output                0 ml   Net             1920 ml        GI/Nutrition:  Orders Placed This Encounter   Procedures   • Diet Order Low Fiber(GI Soft)     Standing Status:   Standing     Number of Occurrences:   1     Order Specific Question:   Diet:     Answer:   Low Fiber(GI Soft) [2]     Medical Decision Making, by  Problem:  Active Hospital Problems    Diagnosis   • Partial small bowel obstruction (HCC) [K56.600]   • Abdominal mass [R19.00]   • HTN (hypertension) [I10]   • Hypothyroid [E03.9]   • Class 1 obesity in adult [E66.9]   • HLD (hyperlipidemia) [E78.5]       Plan:  1.  Metastatic NSCLC--imaging shows a 2.6 cm right upper lobe lung mass, a 2.1 cm left upper lobe lung mass, and multiple masses in the abdomen.  He has a destructive lesion in the right ilium, extending into the sacrum.  MRI of the brain was negative.  --Scheduled to get a bone scan tomorrow  --I will contact pathology on Tuesday to have him send his tumor out for [facility] testing  --It will take 2 weeks for this test to give us a result.  This will help guide what type of treatment we will pick for him.  --Once he is stable from the hospital team's standpoint, he can be discharged.  --I can follow-up on his test results as an outpatient  --I will plan to see him in about 3 weeks to go over the test results and make treatment decisions.    2.  Cancer related pain-- he has pain in the abdomen related to his multiple tumors, as well as pain in the right hip\sacral area related to the destructive bony lesion.  --Optimize pain control  --Will need to be discharged on pain medications  --Will need follow-up with his primary doctor to renew his pain medications      Disposition: From an oncology standpoint he is okay for discharge.  We will sign off on the case for now.        Quality-Core Measures   Reviewed items::  Radiology images reviewed, Labs reviewed and Medications reviewed  Lowe catheter::  No Lowe  DVT prophylaxis pharmacological::  Enoxaparin (Lovenox)

## (undated) DEVICE — ELECTRODE 850 FOAM ADHESIVE - HYDROGEL RADIOTRNSPRNT (50/PK)

## (undated) DEVICE — SENSOR SPO2 NEO LNCS ADHESIVE (20/BX) SEE USER NOTES

## (undated) DEVICE — GLOVE BIOGEL SZ 8 SURGICAL PF LTX - (50PR/BX 4BX/CA)

## (undated) DEVICE — LACTATED RINGERS INJ. 500 ML - (24EA/CA)

## (undated) DEVICE — CHLORAPREP 26 ML APPLICATOR - ORANGE TINT(25/CA)

## (undated) DEVICE — INTRAOP NEURO IN OR 1:1 PER 15 MIN

## (undated) DEVICE — SUTURE 0 VICRYL PLUS CT-1 - 8 X 18 INCH (12/BX)

## (undated) DEVICE — SUTURE GENERAL

## (undated) DEVICE — SPONGE GAUZESTER 4 X 4 4PLY - (128PK/CA)

## (undated) DEVICE — SET TUBE IRRIGATION (5/BX)

## (undated) DEVICE — KIT ANESTHESIA W/CIRCUIT & 3/LT BAG W/FILTER (20EA/CA)

## (undated) DEVICE — LACTATED RINGERS INJ 1000 ML - (14EA/CA 60CA/PF)

## (undated) DEVICE — PACK NEURO - (2EA/CA)

## (undated) DEVICE — TUBE E-T HI-LO CUFF 8.0MM (10EA/PK)

## (undated) DEVICE — TUBING C&T SET FLYING LEADS DRAIN TUBING (10EA/BX)

## (undated) DEVICE — GLOVE BIOGEL PI INDICATOR SZ 8.0 SURGICAL PF LF -(50/BX 4BX/CA)

## (undated) DEVICE — KIT SURGIFLO W/OUT THROMBIN - (6EA/CA)

## (undated) DEVICE — PROTECTOR ULNA NERVE - (36PR/CA)

## (undated) DEVICE — SHEET PEDIATRIC LAPAROTOMY - (10/CA)

## (undated) DEVICE — SLEEVE, VASO, THIGH, MED

## (undated) DEVICE — KIT ROOM DECONTAMINATION

## (undated) DEVICE — NEPTUNE 4 PORT MANIFOLD - (20/PK)

## (undated) DEVICE — BLADE CLIPPER FITS 2501 CLIPPER (BLUE)  (20EA/CA)

## (undated) DEVICE — SET EXTENSION WITH 2 PORTS (48EA/CA) ***PART #2C8610 IS A SUBSTITUTE*****

## (undated) DEVICE — GLOVE BIOGEL INDICATOR SZ 8.5 SURGICAL PF LTX - (50/BX 4BX/CA)

## (undated) DEVICE — SYRINGE SAFETY 10 ML 18 GA X 1 1/2 BLUNT LL (100/BX 4BX/CA)

## (undated) DEVICE — CANISTER SUCTION 3000ML MECHANICAL FILTER AUTO SHUTOFF MEDI-VAC NONSTERILE LF DISP  (40EA/CA)

## (undated) DEVICE — BLANKET WARMING FULL BODY - (10/CA)

## (undated) DEVICE — MASK ANESTHESIA ADULT  - (100/CA)

## (undated) DEVICE — GLOVE BIOGEL ECLIPSE PF LATEX SIZE 7.5

## (undated) DEVICE — GOWN WARMING STANDARD FLEX - (30/CA)

## (undated) DEVICE — SET LEADWIRE 5 LEAD BEDSIDE DISPOSABLE ECG (1SET OF 5/EA)

## (undated) DEVICE — TOOL DISSECT MATCH HEAD

## (undated) DEVICE — SODIUM CHL. INJ. 0.9% 500ML (24EA/CA 50CA/PF)

## (undated) DEVICE — GLOVE BIOGEL SZ 6.5 SURGICAL PF LTX (50PR/BX 4BX/CA)

## (undated) DEVICE — NEEDLE NON SAFETY HYPO 22 GA X 1 1/2 IN (100/BX)

## (undated) DEVICE — PIN HEAD MAYFIELD DISP. (3EA/PK 12PK/BX)

## (undated) DEVICE — BIT DRILL 2.4MM (1TCONX3=3)

## (undated) DEVICE — ELECTRODE DUAL RETURN W/ CORD - (50/PK)

## (undated) DEVICE — SODIUM CHL IRRIGATION 0.9% 1000ML (12EA/CA)

## (undated) DEVICE — BIT MIDAS DIAMOND MATCH HEAD 2.2MM

## (undated) DEVICE — BOVIE BLADE COATED &INSULATED - 25/PK

## (undated) DEVICE — PATTIES SURG X-RAYCOTTONOID - 1/2 X 3 IN (200/CA)

## (undated) DEVICE — TUBING CLEARLINK DUO-VENT - C-FLO (48EA/CA)

## (undated) DEVICE — MIDAS LUBRICATOR DIFFUSER PACK (4EA/CA)

## (undated) DEVICE — SUCTION INSTRUMENT YANKAUER BULBOUS TIP W/O VENT (50EA/CA)

## (undated) DEVICE — SCALPEL BONE 10MM BLUNT CERVICAL (5EA/BX)

## (undated) DEVICE — SYRINGE ASEPTO - (50EA/CA

## (undated) DEVICE — GLOVE BIOGEL INDICATOR SZ 7SURGICAL PF LTX - (50/BX 4BX/CA)